# Patient Record
Sex: FEMALE | Race: WHITE | NOT HISPANIC OR LATINO | Employment: OTHER | URBAN - METROPOLITAN AREA
[De-identification: names, ages, dates, MRNs, and addresses within clinical notes are randomized per-mention and may not be internally consistent; named-entity substitution may affect disease eponyms.]

---

## 2017-02-08 ENCOUNTER — PATIENT MESSAGE (OUTPATIENT)
Dept: INTERNAL MEDICINE | Facility: IMAGING CENTER | Age: 67
End: 2017-02-08

## 2017-02-08 DIAGNOSIS — L65.9 ALOPECIA: ICD-10-CM

## 2017-02-08 NOTE — TELEPHONE ENCOUNTER
From: Michelle Rosenberg  To: Erica Bernard M.D.  Sent: 2/8/2017 1:14 PM PST  Subject: Non-Urgent Medical Question    Good afternoon Dr. Bernard....    I was having my hair colored today and my  brought up his concern for the third month in a row. I have a bald spot started on the back/side of my head. He thinks it's a kind of alopecia and suggests I see a dermatologist. It's about the size of a nickel. Three months ago it's was the size of a pea.     Can you suggest one to visit?    Sincerely,    Michelle rosenberg

## 2017-03-07 ENCOUNTER — SLEEP CENTER VISIT (OUTPATIENT)
Dept: SLEEP MEDICINE | Facility: MEDICAL CENTER | Age: 67
End: 2017-03-07
Payer: COMMERCIAL

## 2017-03-07 VITALS
RESPIRATION RATE: 16 BRPM | TEMPERATURE: 97.5 F | BODY MASS INDEX: 30.56 KG/M2 | WEIGHT: 179 LBS | OXYGEN SATURATION: 93 % | SYSTOLIC BLOOD PRESSURE: 120 MMHG | DIASTOLIC BLOOD PRESSURE: 80 MMHG | HEART RATE: 79 BPM | HEIGHT: 64 IN

## 2017-03-07 DIAGNOSIS — G47.34 NOCTURNAL HYPOXEMIA: ICD-10-CM

## 2017-03-07 DIAGNOSIS — I48.0 PAROXYSMAL ATRIAL FIBRILLATION (HCC): Chronic | ICD-10-CM

## 2017-03-07 DIAGNOSIS — Z87.891 FORMER SMOKER: ICD-10-CM

## 2017-03-07 PROCEDURE — 99202 OFFICE O/P NEW SF 15 MIN: CPT | Performed by: NURSE PRACTITIONER

## 2017-03-07 NOTE — PROGRESS NOTES
Chief Complaint   Patient presents with   • New Patient         HPI: This patient is a 66 y.o. female, who presents for evaluation and management of suspected sleep-disordered breathing. She was referred by cardiology Dr. Walter Brady. She has a history of paroxysmal atrial fibrillation, status post cardioversion x2. She is anticoagulated with Pradaxa. History of breast cancer with bilateral mastectomy and chemotherapy in 1996. She completed tamoxifen for 5 years. Patient denies significant sleep symptoms. She typically falls asleep quickly, only wakes up one time per night to urinate. Denies daytime hypersomnolence or morning headaches. She awakens spontaneously and feels rested. She is unsure if she snores. She lives alone. Denies symptoms of narcolepsy, cataplexy or RLS. Overall feels very well. She denies history of ENT surgery. She is a former smoker, 88-pack-year history, quit in 2012. Denies respiratory symptoms.    Past Medical History   Diagnosis Date   • Atrial fibrillation (CMS-HCC)    • History of breast cancer 7/29/2016     Bilateral--status post bilat mastectomy, chemo. 1996 Tamoxifen x 5 yr.    • Cold 11/28/2016     resolved    • Paroxysmal atrial fibrillation (CMS-HCC)    • Cancer (CMS-HCC) 1996     Bilat breast , chemo and reconstruction   • Cataract      bilateral   • Breast cancer (CMS-HCC)    • Kidney stone    • Pneumonia    • Nasal drainage    • Chickenpox    • Mumps    • Influenza    • Tonsillitis    • Smallpox        Social History   Substance Use Topics   • Smoking status: Former Smoker -- 2.00 packs/day for 44 years     Quit date: 02/14/2012   • Smokeless tobacco: Never Used   • Alcohol Use: Yes      Comment: social        Family History   Problem Relation Age of Onset   • Heart Disease Mother    • Cancer Paternal Grandmother      breast   • Heart Failure Sister      atrial fibrillation and CHF       Current medications as of today   Current Outpatient Prescriptions   Medication Sig  "Dispense Refill   • flecainide (TAMBOCOR) 100 MG Tab Take 1 Tab by mouth 2 times a day. 180 Tab 3   • Calcium Citrate-Vitamin D (CALCIUM + D PO) Take 1,200 mg by mouth.     • dabigatran (PRADAXA) 150 MG Cap capsule Take 1 Cap by mouth 2 Times a Day. 180 Cap 3   • vitamin D (CHOLECALCIFEROL) 1000 UNIT Tab Take 2,000 Units by mouth every day.     • MAGNESIUM PO      • OMEGA-3 KRILL OIL PO Take 1 Tab by mouth every day.       • Probiotic Product (PROBIOTIC PO) Take 1 Tab by mouth every day.         No current facility-administered medications for this visit.       Allergies: Blackberry flavor; Blueberry flavor; Cranberry; Food; Raspberry; Strawberry; Iodine; and Vicodin    Blood pressure 120/80, pulse 79, temperature 36.4 °C (97.5 °F), resp. rate 16, height 1.626 m (5' 4.02\"), weight 81.194 kg (179 lb), SpO2 93 %.      ROS:   Constitutional: Denies fevers, chills, night sweats, weight loss or fatigue  HEENT: Denies earache, difficulty hearing, tinnitus, nasal congestion, hoarseness  Cardiovascular: Denies chest pain, tightness, palpitations, orthopnea or edema  Respiratory: Denies cough, wheeze, dyspnea, hemoptysis  Sleep: See HPI  GI: Denies heartburn, dysphagia, nausea, abdominal pain, diarrhea or constipation  : Denies frequent urination, hematuria, discharge or painful urination  Musculoskeletal: Denies back pain, painful joints, sore muscles  Neurological: Denies weakness or headaches  Skin: No rashes    Physical exam:   Appearance: Well-nourished, well-developed, in no acute distress  HEENT: Normocephalic, atraumatic, white sclera, PERRLA  Respiratory: no intercostal retractions or accessory muscle use   Lungs auscultation: Clear to auscultation bilaterally  Cardiovascular: Regular rate rhythm no murmurs, rubs or gallops  Gait: Normal  Digits: No clubbing, cyanosis  Motor: No focal deficits  Orientation: Oriented to time, person and place    Diagnosis:  1. Paroxysmal atrial fibrillation (HCC)  OVERNIGHT HOME " SLEEP STUDY   2. Nocturnal hypoxemia     3. Former smoker         Plan:  1. Home sleep study now  2. Follow-up after study to review results  3. Consider lung cancer screening CT given smoking history at next visit

## 2017-03-07 NOTE — MR AVS SNAPSHOT
"Michelle Ponce May   3/7/2017 9:40 AM   Sleep Center Visit   MRN: [id number]    Department:  [facility]   Dept Phone:  [phone]    Description:  Female : 1950   Provider:  MATTHIAS Thomas           Reason for Visit     New Patient           Allergies as of 3/7/2017     Allergen Noted Reactions    Blackberry Flavor 02/15/2013   Anaphylaxis    anaphylaxis    Blueberry Flavor 02/15/2013   Anaphylaxis    anaphylaxis    Cranberry 02/15/2013   Anaphylaxis    anaphylaxis    Food 10/28/2012   Anaphylaxis    All Berries      Raspberry 02/15/2013   Anaphylaxis    anaphylaxis    Strawberry 02/15/2013   Anaphylaxis    anaphylaxis    Iodine 10/28/2012   Swelling    Swelling- IV  Contrast, topical OK    Vicodin [Hydrocodone-Acetaminophen] 02/15/2013   Vomiting    sick      You were diagnosed with     Paroxysmal atrial fibrillation (CMS-HCC)   [450349]       Nocturnal hypoxemia   [373901]       Former smoker   [851448]         Vital Signs     Blood Pressure Pulse Temperature Respirations Height Weight    120/80 mmHg 79 36.4 °C (97.5 °F) 16 1.626 m (5' 4.02\") 81.194 kg (179 lb)    Body Mass Index Oxygen Saturation Smoking Status             30.71 kg/m2 93% Former Smoker         Basic Information     Date Of Birth Sex Race Ethnicity Preferred Language    1950 Female White Non- English      Your appointments     Mar 21, 2017  2:10 PM   Sleep Study Home with SLEEP CLINIC   [facility] (--)    [address]  [facility] [address]  [email or web address] [id number]   [phone]            2017  9:00 AM   Follow UP with MATTHIAS Thomas   [facility] (--)    [address]  [facility] [address]  [email or web address] [address]   [phone]              Problem List              ICD-10-CM Priority Class Noted - Resolved    Nephrolithiasis N20.0   10/29/2012 - Present    Paroxysmal atrial fibrillation (HCC) (Chronic) I48.0   Unknown - Present   " Dyslipidemia E78.5   10/31/2012 - Present    History of breast cancer Z85.3   7/29/2016 - Present    Nocturnal hypoxemia G47.34   3/7/2017 - Present    Former smoker Z87.891   3/7/2017 - Present      Health Maintenance        Date Due Completion Dates    IMM ZOSTER VACCINE 11/21/2009 ---    IMM PNEUMOCOCCAL 65+ (ADULT) LOW/MEDIUM RISK SERIES (2 of 2 - PPSV23) 2/15/2018 9/12/2016, 2/15/2013    COLONOSCOPY 1/31/2019 1/31/2009 (Done)    Override on 1/31/2009: Done (Dr. Luo did the colonoscopy but does not have the file anymore)    PAP SMEAR 8/9/2019 8/9/2016    BONE DENSITY 8/31/2021 8/31/2016, 12/10/2008    IMM DTaP/Tdap/Td Vaccine (3 - Td) 8/9/2026 8/9/2016, 9/12/2002            Current Immunizations     13-VALENT PCV PREVNAR 9/12/2016    Influenza TIV (IM) 10/28/2012  8:00 PM    Influenza Vaccine Adult HD 11/11/2016    Pneumococcal polysaccharide vaccine (PPSV-23) 2/15/2013  9:39 PM    Tdap Vaccine 8/9/2016, 9/12/2002      Below and/or attached are the medications your provider expects you to take. Review all of your home medications and newly ordered medications with your provider and/or pharmacist. Follow medication instructions as directed by your provider and/or pharmacist. Please keep your medication list with you and share with your provider. Update the information when medications are discontinued, doses are changed, or new medications (including over-the-counter products) are added; and carry medication information at all times in the event of emergency situations     Allergies:  BLACKBERRY FLAVOR - Anaphylaxis     BLUEBERRY FLAVOR - Anaphylaxis     CRANBERRY - Anaphylaxis     FOOD - Anaphylaxis     RASPBERRY - Anaphylaxis     STRAWBERRY - Anaphylaxis     IODINE - Swelling     VICODIN - Vomiting               Medications  Valid as of: March 07, 2017 - 10:06 AM    Generic Name Brand Name Tablet Size Instructions for use    Calcium Citrate-Vitamin D   Take 1,200 mg by mouth.        Cholecalciferol (Tab)  cholecalciferol 1000 UNIT Take 2,000 Units by mouth every day.        Dabigatran Etexilate Mesylate (Cap) PRADAXA 150 MG Take 1 Cap by mouth 2 Times a Day.        Flecainide Acetate (Tab) TAMBOCOR 100 MG Take 1 Tab by mouth 2 times a day.        Krill Oil   Take 1 Tab by mouth every day.          Magnesium           Probiotic Product   Take 1 Tab by mouth every day.          .                 Medicines prescribed today were sent to:     [id number] - [place], [place] - [address]    [address] [place] [address]    Phone: [phone] Fax: [phone]    Open 24 Hours?: No      Medication refill instructions:       If your prescription bottle indicates you have medication refills left, it is not necessary to call your provider’s office. Please contact your pharmacy and they will refill your medication.    If your prescription bottle indicates you do not have any refills left, you may request refills at any time through one of the following ways: The online [employer] system (except Urgent Care), by calling your provider’s office, or by asking your pharmacy to contact your provider’s office with a refill request. Medication refills are processed only during regular business hours and may not be available until the next business day. Your provider may request additional information or to have a follow-up visit with you prior to refilling your medication.   *Please Note: Medication refills are assigned a new Rx number when refilled electronically. Your pharmacy may indicate that no refills were authorized even though a new prescription for the same medication is available at the pharmacy. Please request the medicine by name with the pharmacy before contacting your provider for a refill.        Your To Do List     Future Labs/Procedures Complete By Expires    OVERNIGHT HOME SLEEP STUDY  As directed 3/7/2018      Instructions    1. Home sleep study ASAP  2. Follow up after testing to review          [employer] Access  Code: Activation code not generated  Current MyChart Status: Active

## 2017-03-20 ENCOUNTER — RX ONLY (OUTPATIENT)
Age: 67
Setting detail: RX ONLY
End: 2017-03-20

## 2017-03-20 ENCOUNTER — HOSPITAL ENCOUNTER (OUTPATIENT)
Dept: LAB | Facility: MEDICAL CENTER | Age: 67
End: 2017-03-20
Attending: NURSE PRACTITIONER
Payer: COMMERCIAL

## 2017-03-20 PROBLEM — L63.9 ALOPECIA AREATA, UNSPECIFIED: Status: ACTIVE | Noted: 2017-03-20

## 2017-03-20 LAB — FERRITIN SERPL-MCNC: 91.7 NG/ML (ref 10–291)

## 2017-03-20 PROCEDURE — 36415 COLL VENOUS BLD VENIPUNCTURE: CPT

## 2017-03-20 PROCEDURE — 82728 ASSAY OF FERRITIN: CPT

## 2017-03-20 PROCEDURE — 86038 ANTINUCLEAR ANTIBODIES: CPT

## 2017-03-21 ENCOUNTER — HOME STUDY (OUTPATIENT)
Dept: SLEEP MEDICINE | Facility: MEDICAL CENTER | Age: 67
End: 2017-03-21
Attending: NURSE PRACTITIONER
Payer: COMMERCIAL

## 2017-03-21 DIAGNOSIS — I48.0 PAROXYSMAL ATRIAL FIBRILLATION (HCC): Chronic | ICD-10-CM

## 2017-03-21 PROCEDURE — 95806 SLEEP STUDY UNATT&RESP EFFT: CPT | Performed by: INTERNAL MEDICINE

## 2017-03-21 NOTE — PROCEDURES
Ms. Diego has a history of paroxysmal atrial fibrillation but she does not have other sleep or medical diagnoses that would contraindicate a home sleep test.    7 hours of data are available for review and the information appears to be of good quality for analysis.    The apnea hypopnea index is 23.2 events per hour, primarily including hypopnea episodes with occasional obstructive apneas.  The lowest arterial oxygen saturation is 70% on room air with an oxygen saturation desaturation index of 33.3 events per hour.  She spends 97% of the study time, or almost 7 hours in total, with a saturation less than 90%.  The heart rate varies from 60-84 bpm.  Frequent snoring is noted.    Assessment:  Moderate obstructive sleep apnea hypopnea with an apnea hypopnea index of 23.2 events per hour.  Significant nocturnal hypoxemia with a lowest arterial oxygen saturation of 70% on room air.    Recommendations:  Airway pressurization, ideally guided by a titration polysomnogram.  Auto titrating CPAP is an option. Alternative treatments for sleep-disordered breathing include reconstructive otolaryngologic surgery, dental appliances and weight loss. If any these latter treatment options are selected, a follow-up polysomnogram is suggested to assess the efficacy of therapy. Behavioral measures including avoidance of sedatives, alcohol and supine body position may be of additional benefit.

## 2017-03-21 NOTE — MR AVS SNAPSHOT
Michelle Ponce May   3/21/2017 2:10 PM   Home Study   MRN: [id number]    Department:  [facility]   Dept Phone:  [phone]    Description:  Female : 1950   Provider:  Senthil VALENZUELA M.D.           Allergies as of 3/21/2017     Allergen Noted Reactions    Blackberry Flavor 02/15/2013   Anaphylaxis    anaphylaxis    Blueberry Flavor 02/15/2013   Anaphylaxis    anaphylaxis    Cranberry 02/15/2013   Anaphylaxis    anaphylaxis    Food 10/28/2012   Anaphylaxis    All Berries      Raspberry 02/15/2013   Anaphylaxis    anaphylaxis    Strawberry 02/15/2013   Anaphylaxis    anaphylaxis    Iodine 10/28/2012   Swelling    Swelling- IV  Contrast, topical OK    Vicodin [Hydrocodone-Acetaminophen] 02/15/2013   Vomiting    sick      You were diagnosed with     Paroxysmal atrial fibrillation (CMS-HCC)   [332148]         Vital Signs     Smoking Status                   Former Smoker           Basic Information     Date Of Birth Sex Race Ethnicity Preferred Language    1950 Female White Non- English      Your appointments     2017  9:00 AM   Follow UP with MATTHIAS Thomas   [facility] (--)    [address]  [place] [address]   [phone]              Problem List              ICD-10-CM Priority Class Noted - Resolved    Nephrolithiasis N20.0   10/29/2012 - Present    Paroxysmal atrial fibrillation (HCC) (Chronic) I48.0   Unknown - Present    Dyslipidemia E78.5   10/31/2012 - Present    History of breast cancer Z85.3   2016 - Present    Nocturnal hypoxemia G47.34   3/7/2017 - Present    Former smoker Z87.891   3/7/2017 - Present      Health Maintenance        Date Due Completion Dates    IMM ZOSTER VACCINE 2009 ---    IMM PNEUMOCOCCAL 65+ (ADULT) LOW/MEDIUM RISK SERIES (2 of 2 - PPSV23) 2/15/2018 2016, 2/15/2013    COLONOSCOPY 2019 (Done)    Override on 2009: Done (Dr. Luo did the  colonoscopy but does not have the file anymore)    PAP SMEAR 8/9/2019 8/9/2016    BONE DENSITY 8/31/2021 8/31/2016, 12/10/2008    IMM DTaP/Tdap/Td Vaccine (3 - Td) 8/9/2026 8/9/2016, 9/12/2002            Current Immunizations     13-VALENT PCV PREVNAR 9/12/2016    Influenza TIV (IM) 10/28/2012  8:00 PM    Influenza Vaccine Adult HD 11/11/2016    Pneumococcal polysaccharide vaccine (PPSV-23) 2/15/2013  9:39 PM    Tdap Vaccine 8/9/2016, 9/12/2002      Below and/or attached are the medications your provider expects you to take. Review all of your home medications and newly ordered medications with your provider and/or pharmacist. Follow medication instructions as directed by your provider and/or pharmacist. Please keep your medication list with you and share with your provider. Update the information when medications are discontinued, doses are changed, or new medications (including over-the-counter products) are added; and carry medication information at all times in the event of emergency situations     Allergies:  BLACKBERRY FLAVOR - Anaphylaxis     BLUEBERRY FLAVOR - Anaphylaxis     CRANBERRY - Anaphylaxis     FOOD - Anaphylaxis     RASPBERRY - Anaphylaxis     STRAWBERRY - Anaphylaxis     IODINE - Swelling     VICODIN - Vomiting               Medications  Valid as of: March 21, 2017 -  2:21 PM    Generic Name Brand Name Tablet Size Instructions for use    Calcium Citrate-Vitamin D   Take 1,200 mg by mouth.        Cholecalciferol (Tab) cholecalciferol 1000 UNIT Take 2,000 Units by mouth every day.        Dabigatran Etexilate Mesylate (Cap) PRADAXA 150 MG Take 1 Cap by mouth 2 Times a Day.        Flecainide Acetate (Tab) TAMBOCOR 100 MG Take 1 Tab by mouth 2 times a day.        Krill Oil   Take 1 Tab by mouth every day.          Magnesium           Probiotic Product   Take 1 Tab by mouth every day.          .                 Medicines prescribed today were sent to:     [id number] [place], [place] - [address]     [address] [place] [address]    Phone: [phone] Fax: [phone]    Open 24 Hours?: No      Medication refill instructions:       If your prescription bottle indicates you have medication refills left, it is not necessary to call your provider’s office. Please contact your pharmacy and they will refill your medication.    If your prescription bottle indicates you do not have any refills left, you may request refills at any time through one of the following ways: The online [employer] system (except Urgent Care), by calling your provider’s office, or by asking your pharmacy to contact your provider’s office with a refill request. Medication refills are processed only during regular business hours and may not be available until the next business day. Your provider may request additional information or to have a follow-up visit with you prior to refilling your medication.   *Please Note: Medication refills are assigned a new Rx number when refilled electronically. Your pharmacy may indicate that no refills were authorized even though a new prescription for the same medication is available at the pharmacy. Please request the medicine by name with the pharmacy before contacting your provider for a refill.           [employer] Access Code: Activation code not generated  Current [employer] Prote[employer] Status: Active

## 2017-03-22 LAB — NUCLEAR IGG SER QL IA: NORMAL

## 2017-04-28 ENCOUNTER — APPOINTMENT (OUTPATIENT)
Dept: SLEEP MEDICINE | Facility: MEDICAL CENTER | Age: 67
End: 2017-04-28
Payer: COMMERCIAL

## 2017-10-14 ENCOUNTER — NON-PROVIDER VISIT (OUTPATIENT)
Dept: INTERNAL MEDICINE | Facility: IMAGING CENTER | Age: 67
End: 2017-10-14
Payer: COMMERCIAL

## 2017-10-14 DIAGNOSIS — Z23 NEED FOR INFLUENZA VACCINATION: ICD-10-CM

## 2017-10-14 PROCEDURE — 90662 IIV NO PRSV INCREASED AG IM: CPT | Performed by: FAMILY MEDICINE

## 2017-10-14 PROCEDURE — 90471 IMMUNIZATION ADMIN: CPT | Performed by: FAMILY MEDICINE

## 2017-12-27 DIAGNOSIS — I48.0 PAROXYSMAL ATRIAL FIBRILLATION (HCC): ICD-10-CM

## 2017-12-27 RX ORDER — DABIGATRAN ETEXILATE 150 MG/1
150 CAPSULE ORAL 2 TIMES DAILY
Qty: 180 CAP | Refills: 0 | OUTPATIENT
Start: 2017-12-27 | End: 2021-09-28 | Stop reason: SDUPTHER

## 2018-10-11 ENCOUNTER — NON-PROVIDER VISIT (OUTPATIENT)
Dept: INTERNAL MEDICINE | Facility: IMAGING CENTER | Age: 68
End: 2018-10-11
Payer: COMMERCIAL

## 2018-10-11 DIAGNOSIS — Z23 NEED FOR INFLUENZA VACCINATION: ICD-10-CM

## 2018-10-11 DIAGNOSIS — Z23 NEED FOR PNEUMOCOCCAL VACCINATION: ICD-10-CM

## 2018-10-11 PROCEDURE — 90662 IIV NO PRSV INCREASED AG IM: CPT | Performed by: FAMILY MEDICINE

## 2018-10-11 PROCEDURE — 90471 IMMUNIZATION ADMIN: CPT | Performed by: FAMILY MEDICINE

## 2018-10-11 PROCEDURE — 90732 PPSV23 VACC 2 YRS+ SUBQ/IM: CPT | Performed by: FAMILY MEDICINE

## 2018-10-11 PROCEDURE — 90472 IMMUNIZATION ADMIN EACH ADD: CPT | Performed by: FAMILY MEDICINE

## 2018-10-19 ENCOUNTER — NON-PROVIDER VISIT (OUTPATIENT)
Dept: INTERNAL MEDICINE | Facility: IMAGING CENTER | Age: 68
End: 2018-10-19
Payer: COMMERCIAL

## 2018-10-19 DIAGNOSIS — Z23 NEED FOR SHINGLES VACCINE: ICD-10-CM

## 2018-10-19 PROCEDURE — 90750 HZV VACC RECOMBINANT IM: CPT | Performed by: FAMILY MEDICINE

## 2018-10-19 PROCEDURE — 90471 IMMUNIZATION ADMIN: CPT | Performed by: FAMILY MEDICINE

## 2019-02-08 ENCOUNTER — NON-PROVIDER VISIT (OUTPATIENT)
Dept: INTERNAL MEDICINE | Facility: IMAGING CENTER | Age: 69
End: 2019-02-08
Payer: COMMERCIAL

## 2019-02-08 DIAGNOSIS — Z23 NEED FOR SHINGLES VACCINE: ICD-10-CM

## 2019-02-08 PROCEDURE — 90750 HZV VACC RECOMBINANT IM: CPT | Performed by: FAMILY MEDICINE

## 2019-02-08 PROCEDURE — 90471 IMMUNIZATION ADMIN: CPT | Performed by: FAMILY MEDICINE

## 2019-10-11 ENCOUNTER — NON-PROVIDER VISIT (OUTPATIENT)
Dept: INTERNAL MEDICINE | Facility: IMAGING CENTER | Age: 69
End: 2019-10-11
Payer: COMMERCIAL

## 2019-10-11 DIAGNOSIS — Z23 NEED FOR INFLUENZA VACCINATION: ICD-10-CM

## 2019-10-11 PROCEDURE — 90471 IMMUNIZATION ADMIN: CPT | Performed by: FAMILY MEDICINE

## 2019-10-11 PROCEDURE — 90662 IIV NO PRSV INCREASED AG IM: CPT | Performed by: FAMILY MEDICINE

## 2020-10-21 ENCOUNTER — NON-PROVIDER VISIT (OUTPATIENT)
Dept: INTERNAL MEDICINE | Facility: IMAGING CENTER | Age: 70
End: 2020-10-21
Payer: COMMERCIAL

## 2020-10-21 DIAGNOSIS — Z23 NEED FOR INFLUENZA VACCINATION: ICD-10-CM

## 2020-10-21 PROCEDURE — 90471 IMMUNIZATION ADMIN: CPT | Performed by: FAMILY MEDICINE

## 2020-10-21 PROCEDURE — 90662 IIV NO PRSV INCREASED AG IM: CPT | Performed by: FAMILY MEDICINE

## 2020-11-05 DIAGNOSIS — E55.9 VITAMIN D DEFICIENCY: ICD-10-CM

## 2020-11-05 DIAGNOSIS — Z11.59 ENCOUNTER FOR HEPATITIS C SCREENING TEST FOR LOW RISK PATIENT: ICD-10-CM

## 2020-11-05 DIAGNOSIS — E78.5 DYSLIPIDEMIA: ICD-10-CM

## 2020-11-05 DIAGNOSIS — R73.9 HYPERGLYCEMIA: ICD-10-CM

## 2020-11-05 DIAGNOSIS — I48.0 PAROXYSMAL ATRIAL FIBRILLATION (HCC): ICD-10-CM

## 2020-11-06 ENCOUNTER — NON-PROVIDER VISIT (OUTPATIENT)
Dept: INTERNAL MEDICINE | Facility: IMAGING CENTER | Age: 70
End: 2020-11-06
Payer: COMMERCIAL

## 2020-11-06 ENCOUNTER — HOSPITAL ENCOUNTER (OUTPATIENT)
Facility: MEDICAL CENTER | Age: 70
End: 2020-11-06
Attending: FAMILY MEDICINE
Payer: COMMERCIAL

## 2020-11-06 PROCEDURE — 82306 VITAMIN D 25 HYDROXY: CPT

## 2020-11-06 PROCEDURE — 80053 COMPREHEN METABOLIC PANEL: CPT

## 2020-11-06 PROCEDURE — 85025 COMPLETE CBC W/AUTO DIFF WBC: CPT

## 2020-11-06 PROCEDURE — 86803 HEPATITIS C AB TEST: CPT

## 2020-11-06 PROCEDURE — 83036 HEMOGLOBIN GLYCOSYLATED A1C: CPT

## 2020-11-06 PROCEDURE — 80061 LIPID PANEL: CPT

## 2020-11-07 DIAGNOSIS — Z11.59 ENCOUNTER FOR HEPATITIS C SCREENING TEST FOR LOW RISK PATIENT: ICD-10-CM

## 2020-11-07 DIAGNOSIS — I48.0 PAROXYSMAL ATRIAL FIBRILLATION (HCC): ICD-10-CM

## 2020-11-07 DIAGNOSIS — E78.5 DYSLIPIDEMIA: ICD-10-CM

## 2020-11-07 DIAGNOSIS — R73.9 HYPERGLYCEMIA: ICD-10-CM

## 2020-11-07 DIAGNOSIS — E55.9 VITAMIN D DEFICIENCY: ICD-10-CM

## 2020-11-07 LAB
25(OH)D3 SERPL-MCNC: 24 NG/ML (ref 30–100)
ALBUMIN SERPL BCP-MCNC: 4.3 G/DL (ref 3.2–4.9)
ALBUMIN/GLOB SERPL: 1.4 G/DL
ALP SERPL-CCNC: 77 U/L (ref 30–99)
ALT SERPL-CCNC: 17 U/L (ref 2–50)
ANION GAP SERPL CALC-SCNC: 11 MMOL/L (ref 7–16)
AST SERPL-CCNC: 19 U/L (ref 12–45)
BASOPHILS # BLD AUTO: 1.1 % (ref 0–1.8)
BASOPHILS # BLD: 0.06 K/UL (ref 0–0.12)
BILIRUB SERPL-MCNC: 1 MG/DL (ref 0.1–1.5)
BUN SERPL-MCNC: 17 MG/DL (ref 8–22)
CALCIUM SERPL-MCNC: 9.3 MG/DL (ref 8.5–10.5)
CHLORIDE SERPL-SCNC: 92 MMOL/L (ref 96–112)
CHOLEST SERPL-MCNC: 199 MG/DL (ref 100–199)
CO2 SERPL-SCNC: 26 MMOL/L (ref 20–33)
CREAT SERPL-MCNC: 0.92 MG/DL (ref 0.5–1.4)
EOSINOPHIL # BLD AUTO: 0.35 K/UL (ref 0–0.51)
EOSINOPHIL NFR BLD: 6.4 % (ref 0–6.9)
ERYTHROCYTE [DISTWIDTH] IN BLOOD BY AUTOMATED COUNT: 45.1 FL (ref 35.9–50)
EST. AVERAGE GLUCOSE BLD GHB EST-MCNC: 105 MG/DL
GLOBULIN SER CALC-MCNC: 3 G/DL (ref 1.9–3.5)
GLUCOSE SERPL-MCNC: 81 MG/DL (ref 65–99)
HBA1C MFR BLD: 5.3 % (ref 0–5.6)
HCT VFR BLD AUTO: 50.8 % (ref 37–47)
HCV AB SER QL: NORMAL
HDLC SERPL-MCNC: 55 MG/DL
HGB BLD-MCNC: 16.4 G/DL (ref 12–16)
IMM GRANULOCYTES # BLD AUTO: 0.01 K/UL (ref 0–0.11)
IMM GRANULOCYTES NFR BLD AUTO: 0.2 % (ref 0–0.9)
LDLC SERPL CALC-MCNC: 120 MG/DL
LYMPHOCYTES # BLD AUTO: 1.22 K/UL (ref 1–4.8)
LYMPHOCYTES NFR BLD: 22.3 % (ref 22–41)
MCH RBC QN AUTO: 30.6 PG (ref 27–33)
MCHC RBC AUTO-ENTMCNC: 32.3 G/DL (ref 33.6–35)
MCV RBC AUTO: 94.8 FL (ref 81.4–97.8)
MONOCYTES # BLD AUTO: 0.65 K/UL (ref 0–0.85)
MONOCYTES NFR BLD AUTO: 11.9 % (ref 0–13.4)
NEUTROPHILS # BLD AUTO: 3.17 K/UL (ref 2–7.15)
NEUTROPHILS NFR BLD: 58.1 % (ref 44–72)
NRBC # BLD AUTO: 0.02 K/UL
NRBC BLD-RTO: 0.4 /100 WBC
PLATELET # BLD AUTO: 249 K/UL (ref 164–446)
PMV BLD AUTO: 10.6 FL (ref 9–12.9)
POTASSIUM SERPL-SCNC: 4.2 MMOL/L (ref 3.6–5.5)
PROT SERPL-MCNC: 7.3 G/DL (ref 6–8.2)
RBC # BLD AUTO: 5.36 M/UL (ref 4.2–5.4)
SODIUM SERPL-SCNC: 129 MMOL/L (ref 135–145)
TRIGL SERPL-MCNC: 119 MG/DL (ref 0–149)
WBC # BLD AUTO: 5.5 K/UL (ref 4.8–10.8)

## 2020-11-12 ENCOUNTER — HOSPITAL ENCOUNTER (OUTPATIENT)
Facility: MEDICAL CENTER | Age: 70
End: 2020-11-12
Attending: FAMILY MEDICINE
Payer: COMMERCIAL

## 2020-11-12 ENCOUNTER — OFFICE VISIT (OUTPATIENT)
Dept: INTERNAL MEDICINE | Facility: IMAGING CENTER | Age: 70
End: 2020-11-12
Payer: COMMERCIAL

## 2020-11-12 VITALS
TEMPERATURE: 98 F | HEIGHT: 64 IN | SYSTOLIC BLOOD PRESSURE: 140 MMHG | OXYGEN SATURATION: 94 % | DIASTOLIC BLOOD PRESSURE: 78 MMHG | BODY MASS INDEX: 29.37 KG/M2 | HEART RATE: 98 BPM | WEIGHT: 172 LBS | RESPIRATION RATE: 16 BRPM

## 2020-11-12 DIAGNOSIS — N20.0 CALCULUS OF KIDNEY: ICD-10-CM

## 2020-11-12 DIAGNOSIS — M79.604 RIGHT LEG PAIN: ICD-10-CM

## 2020-11-12 DIAGNOSIS — I48.0 PAROXYSMAL ATRIAL FIBRILLATION (HCC): Chronic | ICD-10-CM

## 2020-11-12 DIAGNOSIS — R32 INCONTINENCE IN FEMALE: ICD-10-CM

## 2020-11-12 DIAGNOSIS — Z87.891 FORMER SMOKER: ICD-10-CM

## 2020-11-12 DIAGNOSIS — Z12.11 COLON CANCER SCREENING: ICD-10-CM

## 2020-11-12 DIAGNOSIS — R82.90 ABNORMAL URINALYSIS: ICD-10-CM

## 2020-11-12 DIAGNOSIS — N30.00 ACUTE CYSTITIS WITHOUT HEMATURIA: ICD-10-CM

## 2020-11-12 DIAGNOSIS — G47.34 NOCTURNAL HYPOXEMIA: ICD-10-CM

## 2020-11-12 PROCEDURE — 99214 OFFICE O/P EST MOD 30 MIN: CPT | Performed by: FAMILY MEDICINE

## 2020-11-12 PROCEDURE — 87086 URINE CULTURE/COLONY COUNT: CPT

## 2020-11-12 PROCEDURE — 87077 CULTURE AEROBIC IDENTIFY: CPT

## 2020-11-12 PROCEDURE — 87186 SC STD MICRODIL/AGAR DIL: CPT

## 2020-11-12 PROCEDURE — 84550 ASSAY OF BLOOD/URIC ACID: CPT

## 2020-11-12 PROCEDURE — 81001 URINALYSIS AUTO W/SCOPE: CPT

## 2020-11-12 ASSESSMENT — FIBROSIS 4 INDEX: FIB4 SCORE: 1.3

## 2020-11-12 ASSESSMENT — PATIENT HEALTH QUESTIONNAIRE - PHQ9: CLINICAL INTERPRETATION OF PHQ2 SCORE: 0

## 2020-11-12 NOTE — ASSESSMENT & PLAN NOTE
Last 2-3 years ago and did not like Dr. Brady. Has been taking her sister's med since she was taking this med.

## 2020-11-12 NOTE — PROGRESS NOTES
Subjective:     CC: urine incontinence and establish care    HPI: Michelle presents today with a complaint for the last 10 months having incontinence of her urination.  Patient is having to wear a pad she has noticed that if she has to urinate she has to go.  Patient denies any problems with leakage of urine on coughing or laughing a lot.  Patient has tried Kegel's and has noticed that this has not helped her.  Patient also notes that she needs to be a little bit more persistent about follow-up for her medical care and states that having to see me as her new provider has given her the ability to follow-up.  Patient does have some discomfort to her left upper side late      Past Medical History:   Diagnosis Date   • Atrial fibrillation (HCC)    • Breast cancer (HCC)    • Cancer (HCC)     Bilat breast , chemo and reconstruction   • Cataract     bilateral   • Chickenpox    • Cold 2016    resolved    • History of breast cancer 2016    Bilateral--status post bilat mastectomy, chemo.  Tamoxifen x 5 yr.    • Influenza    • Kidney stone    • Mumps    • Nasal drainage    • Paroxysmal atrial fibrillation (HCC)    • Pneumonia    • Smallpox    • Tonsillitis        Social History     Tobacco Use   • Smoking status: Former Smoker     Packs/day: 2.00     Years: 44.00     Pack years: 88.00     Quit date: 2012     Years since quittin.7   • Smokeless tobacco: Never Used   Substance Use Topics   • Alcohol use: Yes     Comment: social    • Drug use: No       Current Outpatient Medications Ordered in Epic   Medication Sig Dispense Refill   • Safety Seal Miscellaneous Misc Inflamazol 1 capsule once daily     • dabigatran (PRADAXA) 150 MG Cap capsule Take 1 Cap by mouth 2 Times a Day. Needs to be seen for further refills. Thank you 180 Cap 0   • MAGNESIUM PO      • OMEGA-3 KRILL OIL PO Take 1 Tab by mouth every day.       • Calcium Citrate-Vitamin D (CALCIUM + D PO) Take 1,200 mg by mouth.     • vitamin D  "(CHOLECALCIFEROL) 1000 UNIT Tab Take 2,000 Units by mouth every day.     • Probiotic Product (PROBIOTIC PO) Take 1 Tab by mouth every day.         No current Epic-ordered facility-administered medications on file.        Allergies:  Blackberry flavor, Blueberry flavor, Food, Raspberry, Strawberry, Iodine, and Vicodin [hydrocodone-acetaminophen]    Health Maintenance: Completed    ROS:  Gen: no fevers/chills, no changes in weight  Eyes: no changes in vision  Pulm: no sob, no cough  CV: no chest pain, no palpitations  : no dysuria  Neuro: no headaches  Heme/Lymph: no swelling    Objective:       Exam:  /78   Pulse 98   Temp 36.7 °C (98 °F) (Temporal)   Resp 16   Ht 1.626 m (5' 4\")   Wt 78 kg (172 lb)   SpO2 94%   BMI 29.52 kg/m²  Body mass index is 29.52 kg/m².    Gen: Alert and oriented, No apparent distress.  Neck: Neck is supple without lymphadenopathy.  Lungs: Normal effort, CTA bilaterally, no wheezes, rhonchi, or rales  CV: Irregular but normal rate no murmurs  Ext: No clubbing, cyanosis, edema.  Abdomen: soft no tenderness noted  Back: Straight up to 60 degrees without pain bilaterally and hips wnl range of movement without pain. No discomfort on palpation of the right lateral upper leg    Labs: Patient CBC is within normal limits her chemistry panel appears to be within normal limits blood sugar is slightly elevated along with her lipid panel her LDL was 120.  Patient's hepatitis C screen was negative    Assessment & Plan:     69 y.o. female with the following -       1. Paroxysmal atrial fibrillation (HCC)  Patient has not seen her cardiologist in over 3 years due to the fact she did not like him.  Patient is willing to see another cardiologist at this time.  Patient has been taking her Pradaxa but she is getting it from her sister who gets extra due to the fact her sister also has A. fib.  Patient states that this is the exact medication that she was taking prior to stopping to see her " cardiologist and she has continued to take it.  Patient is willing to see a cardiologist at this time so referral was sent.  This is a chronic problem.    2. Nephrolithiasis  Patient does have a history of once having a kidney stone which she says was from uric acid.  Patient has no problems since she thought she may have had a gouty attack over a year ago but was not seen by anyone for this.  I would definitely recommend getting the uric acid level on her at this time.  Patient was agreeable to this plan.  This is a chronic problem.  - URIC ACID, SERUM    3. Nocturnal hypoxemia  Patient did have a at home sleep study done about 3 to 4 years ago that was she was told it was positive.  Patient did not get the formal sleep study as recommended due to the fact that she did not want to leave her dog all by himself.  Patient denies that when she spends time with other friends and sleeps over that no one is complaining that she is snoring a lot. Her only bed partner is her dog at this time.  Patient at this time is not interested in getting a formal sleep study done I recommend that she let let whoever she stays with when she is visiting to let her know if they notice that she is snoring a lot.  This is a chronic problem.  4. Incontinence in female  It appears by her symptoms that she may have urge incontinence and not stress incontinence.  I recommend we get a urinalysis just to rule out the possibility of infection.  If her urinalysis is within normal limits patient is agreeable to try either Detrol or Ditropan for treatment of urge incontinence.  I did give her warnings that she may have dry mouth with this and just to drink more water.  This is a acute problem.  Reviewing your urinalysis done on day of visit which I did the next day shows possibility of urinary tract infection so we will treat her at this time await urine cultures.  Will contact patient and notify her of the results.  - URINALYSIS,CULTURE IF  INDICATED; Future    5. Former smoker  Patient does have more than a 30-year pack smoking history and did stop smoking 10 years ago.  I did discuss with her recommendations for looking into doing a low-dose CAT scan of the chest at this time patient does not want this.  Patient informs me that all the chest x-ray she has had done in the past have always been within normal limits with no abnormal findings.  I did mention to patient that a CT scan is more accurate detecting masses but at this time patient does not want to get one done.    6. Colon cancer screening  Patient does not want to get a colonoscopy at all she has tried getting a Cologuard but apparently on collection and sending since she has urine in the stool they will not processed her specimen.  Patient has tried different ways of trying to collect stool only but she was unable to do it.  I did recommend to patient that we just try doing a fit test instead patient was very agreeable with this and we will order it.  - OCCULT BLOOD,FECAL,IMMUNOASSAY    7. Right leg pain  On her leg exam it is within normal notes it does not feel.  That this is sciatica.  The area of discomfort which she only gets at nighttime is on the lateral side of her left upper leg I feel like that may be it it may be a local nerve issue.  I am wondering if she is put more pressure to that area and that is why she feels at nighttime patient is going to try sleeping with pillows in certain areas and also she seems to be sleeping on the very edge of her bed and may be changing where she is sleeping in her bed.  If she continues having problems we will need to reevaluate this.  This is a acute problem.    8. Abnormal urinalysis  Will treat and await urine culture results. Acute problem        Please note that this dictation was created using voice recognition software. I have made every reasonable attempt to correct obvious errors, but I expect that there are errors of grammar and  possibly content that I did not discover before finalizing the note.

## 2020-11-13 DIAGNOSIS — N30.00 ACUTE CYSTITIS WITHOUT HEMATURIA: ICD-10-CM

## 2020-11-13 PROBLEM — R82.90 ABNORMAL URINALYSIS: Status: ACTIVE | Noted: 2020-11-13

## 2020-11-13 LAB
APPEARANCE UR: ABNORMAL
BACTERIA #/AREA URNS HPF: ABNORMAL /HPF
BILIRUB UR QL STRIP.AUTO: NEGATIVE
COLOR UR: YELLOW
EPI CELLS #/AREA URNS HPF: ABNORMAL /HPF
GLUCOSE UR STRIP.AUTO-MCNC: NEGATIVE MG/DL
KETONES UR STRIP.AUTO-MCNC: NEGATIVE MG/DL
LEUKOCYTE ESTERASE UR QL STRIP.AUTO: ABNORMAL
MICRO URNS: ABNORMAL
NITRITE UR QL STRIP.AUTO: NEGATIVE
PH UR STRIP.AUTO: 6 [PH] (ref 5–8)
PROT UR QL STRIP: NEGATIVE MG/DL
RBC # URNS HPF: ABNORMAL /HPF
RBC UR QL AUTO: ABNORMAL
SP GR UR STRIP.AUTO: 1.01
TRANS CELLS #/AREA URNS HPF: ABNORMAL /HPF
URATE SERPL-MCNC: 6.6 MG/DL (ref 1.9–8.2)
UROBILINOGEN UR STRIP.AUTO-MCNC: 0.2 MG/DL
WBC #/AREA URNS HPF: ABNORMAL /HPF

## 2020-11-13 RX ORDER — NITROFURANTOIN 25; 75 MG/1; MG/1
100 CAPSULE ORAL 2 TIMES DAILY
Qty: 14 CAP | Refills: 0 | Status: SHIPPED
Start: 2020-11-13 | End: 2020-11-13 | Stop reason: SDUPTHER

## 2020-11-13 RX ORDER — NITROFURANTOIN 25; 75 MG/1; MG/1
100 CAPSULE ORAL 2 TIMES DAILY
Qty: 14 CAP | Refills: 0 | Status: CANCELLED | OUTPATIENT
Start: 2020-11-13 | End: 2020-11-20

## 2020-11-13 RX ORDER — NITROFURANTOIN 25; 75 MG/1; MG/1
100 CAPSULE ORAL 2 TIMES DAILY
Qty: 14 CAP | Refills: 0 | Status: SHIPPED
Start: 2020-11-13 | End: 2021-09-16

## 2020-11-13 RX ORDER — NITROFURANTOIN 25; 75 MG/1; MG/1
100 CAPSULE ORAL 2 TIMES DAILY
Qty: 14 CAP | Refills: 0 | Status: SHIPPED | OUTPATIENT
Start: 2020-11-13 | End: 2020-11-13 | Stop reason: SDUPTHER

## 2020-11-14 ENCOUNTER — HOSPITAL ENCOUNTER (OUTPATIENT)
Facility: MEDICAL CENTER | Age: 70
End: 2020-11-14
Attending: FAMILY MEDICINE
Payer: COMMERCIAL

## 2020-11-14 PROCEDURE — 82274 ASSAY TEST FOR BLOOD FECAL: CPT

## 2020-11-15 LAB
BACTERIA UR CULT: ABNORMAL
BACTERIA UR CULT: ABNORMAL
SIGNIFICANT IND 70042: ABNORMAL
SITE SITE: ABNORMAL
SOURCE SOURCE: ABNORMAL

## 2020-11-24 ENCOUNTER — APPOINTMENT (OUTPATIENT)
Dept: INTERNAL MEDICINE | Facility: IMAGING CENTER | Age: 70
End: 2020-11-24
Payer: COMMERCIAL

## 2020-11-25 ENCOUNTER — HOSPITAL ENCOUNTER (OUTPATIENT)
Facility: MEDICAL CENTER | Age: 70
End: 2020-11-25
Attending: FAMILY MEDICINE
Payer: COMMERCIAL

## 2020-11-25 ENCOUNTER — NON-PROVIDER VISIT (OUTPATIENT)
Dept: INTERNAL MEDICINE | Facility: IMAGING CENTER | Age: 70
End: 2020-11-25
Payer: COMMERCIAL

## 2020-11-25 DIAGNOSIS — N30.00 ACUTE CYSTITIS WITHOUT HEMATURIA: ICD-10-CM

## 2020-11-25 PROCEDURE — 81003 URINALYSIS AUTO W/O SCOPE: CPT

## 2020-11-26 LAB
APPEARANCE UR: CLEAR
BILIRUB UR QL STRIP.AUTO: NEGATIVE
COLOR UR: YELLOW
GLUCOSE UR STRIP.AUTO-MCNC: NEGATIVE MG/DL
KETONES UR STRIP.AUTO-MCNC: NEGATIVE MG/DL
LEUKOCYTE ESTERASE UR QL STRIP.AUTO: NEGATIVE
MICRO URNS: NORMAL
NITRITE UR QL STRIP.AUTO: NEGATIVE
PH UR STRIP.AUTO: 6 [PH] (ref 5–8)
PROT UR QL STRIP: NEGATIVE MG/DL
RBC UR QL AUTO: NEGATIVE
SP GR UR STRIP.AUTO: 1.01
UROBILINOGEN UR STRIP.AUTO-MCNC: 0.2 MG/DL

## 2020-11-27 LAB — HEMOCCULT STL QL IA: NEGATIVE

## 2021-01-16 DIAGNOSIS — Z23 NEED FOR VACCINATION: ICD-10-CM

## 2021-01-27 ENCOUNTER — IMMUNIZATION (OUTPATIENT)
Dept: FAMILY PLANNING/WOMEN'S HEALTH CLINIC | Facility: IMMUNIZATION CENTER | Age: 71
End: 2021-01-27
Attending: INTERNAL MEDICINE
Payer: COMMERCIAL

## 2021-01-27 DIAGNOSIS — Z23 ENCOUNTER FOR VACCINATION: Primary | ICD-10-CM

## 2021-01-27 DIAGNOSIS — Z23 NEED FOR VACCINATION: ICD-10-CM

## 2021-01-27 PROCEDURE — 0001A PFIZER SARS-COV-2 VACCINE: CPT

## 2021-01-27 PROCEDURE — 91300 PFIZER SARS-COV-2 VACCINE: CPT

## 2021-02-19 ENCOUNTER — IMMUNIZATION (OUTPATIENT)
Dept: FAMILY PLANNING/WOMEN'S HEALTH CLINIC | Facility: IMMUNIZATION CENTER | Age: 71
End: 2021-02-19
Attending: INTERNAL MEDICINE
Payer: COMMERCIAL

## 2021-02-19 DIAGNOSIS — Z23 ENCOUNTER FOR VACCINATION: Primary | ICD-10-CM

## 2021-02-19 PROCEDURE — 0002A PFIZER SARS-COV-2 VACCINE: CPT | Performed by: INTERNAL MEDICINE

## 2021-02-19 PROCEDURE — 91300 PFIZER SARS-COV-2 VACCINE: CPT | Performed by: INTERNAL MEDICINE

## 2021-08-25 ENCOUNTER — APPOINTMENT (OUTPATIENT)
Dept: INTERNAL MEDICINE | Facility: IMAGING CENTER | Age: 71
End: 2021-08-25
Payer: COMMERCIAL

## 2021-09-16 ENCOUNTER — OFFICE VISIT (OUTPATIENT)
Dept: INTERNAL MEDICINE | Facility: IMAGING CENTER | Age: 71
End: 2021-09-16
Payer: COMMERCIAL

## 2021-09-16 VITALS
RESPIRATION RATE: 14 BRPM | BODY MASS INDEX: 29.37 KG/M2 | TEMPERATURE: 96.9 F | HEIGHT: 64 IN | OXYGEN SATURATION: 94 % | SYSTOLIC BLOOD PRESSURE: 126 MMHG | HEART RATE: 88 BPM | WEIGHT: 172 LBS | DIASTOLIC BLOOD PRESSURE: 70 MMHG

## 2021-09-16 DIAGNOSIS — I48.0 PAROXYSMAL ATRIAL FIBRILLATION (HCC): Chronic | ICD-10-CM

## 2021-09-16 DIAGNOSIS — Z12.11 COLON CANCER SCREENING: ICD-10-CM

## 2021-09-16 DIAGNOSIS — R73.9 HYPERGLYCEMIA: ICD-10-CM

## 2021-09-16 DIAGNOSIS — Z12.83 SKIN CANCER SCREENING: ICD-10-CM

## 2021-09-16 DIAGNOSIS — Z85.3 HISTORY OF BREAST CANCER: ICD-10-CM

## 2021-09-16 DIAGNOSIS — R32 INCONTINENCE IN FEMALE: ICD-10-CM

## 2021-09-16 DIAGNOSIS — Z78.0 ASYMPTOMATIC POSTMENOPAUSAL STATUS: ICD-10-CM

## 2021-09-16 DIAGNOSIS — I48.91 ATRIAL FIBRILLATION, UNSPECIFIED TYPE (HCC): ICD-10-CM

## 2021-09-16 DIAGNOSIS — Z79.899 MEDICATION MANAGEMENT: ICD-10-CM

## 2021-09-16 DIAGNOSIS — E78.5 DYSLIPIDEMIA: ICD-10-CM

## 2021-09-16 DIAGNOSIS — N39.41 URGE INCONTINENCE: ICD-10-CM

## 2021-09-16 DIAGNOSIS — E55.9 VITAMIN D DEFICIENCY: ICD-10-CM

## 2021-09-16 PROCEDURE — 99215 OFFICE O/P EST HI 40 MIN: CPT | Performed by: FAMILY MEDICINE

## 2021-09-16 RX ORDER — MULTIVIT WITH MINERALS/LUTEIN
1000 TABLET ORAL DAILY
COMMUNITY

## 2021-09-16 ASSESSMENT — PATIENT HEALTH QUESTIONNAIRE - PHQ9: CLINICAL INTERPRETATION OF PHQ2 SCORE: 0

## 2021-09-16 ASSESSMENT — FIBROSIS 4 INDEX: FIB4 SCORE: 1.31

## 2021-09-17 NOTE — PROGRESS NOTES
"Chief Complaint   Patient presents with   • Establish Care       Subjective:     HPI:   Michelle Diego is an extremely pleasant 70 y.o. female here  to establish care and discuss the evaluation and management of:       Problem   Paroxysmal Atrial Fibrillation (Hcc)    Has not been to a cardiologist in a while she has been taking Pradaxa through a family member-who has been sharing her meds        Urge incontinence-she has had trouble holding her urine for over a year.  She has always done Kegel exercises  She has not been interested in taking medication      History of breast cancer-she was diagnosed at age 45 with bilateral breast cancer  She is status post bilateral mastectomy, chemo in 1996 which was followed by 5 years of tamoxifen  She has had no further issues        She is active in many foundations    She does not have a formal exercise routine, does not like going the gyms  She has a regular diet       Objective:     /70   Pulse 88   Temp 36.1 °C (96.9 °F) (Temporal)   Resp 14   Ht 1.626 m (5' 4.02\")   Wt 78 kg (172 lb)   SpO2 94%  Body mass index is 29.51 kg/m².    Physical Exam:  Physical Exam  Constitutional: Well-developed and well-nourished. Not diaphoretic. No distress.  Appearing younger than stated age  Skin: Skin is warm and dry. No rash noted.  Head: Atraumatic without lesions.  Eyes: Conjunctivae and extraocular motions are normal.   Ears:  External ears unremarkable.    Nose: Nares patent. Mucosa without edema or erythema. No discharge. No facial tenderness.     Neck: Supple, No thyromegaly present. No JVD  Cardiovascular: Regular rate and rhythm.   Chest: Effort normal. Clear to auscultation throughout. No adventitious sounds.   Abdomen:  without distention.  .  Extremities: No cyanosis, clubbing, erythema, nor edema.   Neurological: Alert and oriented x 3.    Psychiatric:  Behavior, mood, and affect are appropriate     Assessment and Plan:     The following treatment plan was " discussed/researched:  Paroxysmal atrial fibrillation (HCC)  She has been taking Pradaxa-from a family member  Referral made to cardiology    History of breast cancer  Stable, followed annually with mammograms    Incontinence in female  Referral made today to physical therapy for pelvic floor strengthening    1. Atrial fibrillation, unspecified type (HCC)  REFERRAL TO CARDIOLOGY   2. Asymptomatic postmenopausal status  DS-BONE DENSITY STUDY (DEXA)   3. Colon cancer screening  OCCULT BLOOD X3 (STOOL)   4. Skin cancer screening  REFERRAL TO DERMATOLOGY   5. Vitamin D deficiency  VITAMIN D,25 HYDROXY   6. Dyslipidemia  Lipid Profile    TSH   7. Hyperglycemia  Comp Metabolic Panel    HEMOGLOBIN A1C   8. Medication management  CBC WITH DIFFERENTIAL   9. Urge incontinence  REFERRAL TO PHYSICAL THERAPY                                                                                                                                                                                      Any change or worsening of signs or symptoms, patient encouraged to follow-up or report to emergency room for further evaluation. Patient verbalizes understanding and agrees.    Follow-Up: For next available annual wellness visit    PLEASE NOTE: This dictation was created using voice recognition software. I have made every reasonable attempt to correct obvious errors, but I expect that there are errors of grammar and possibly content that I did not discover before finalizing the note.      My total time spent caring for the patient on the day of the encounter was  greater than 40 minutes.   This includes obtaining history, reviewing chart, physical exam, patient education, reviewing outside records, placing orders, interpreting tests and coordinating care.

## 2021-09-21 ENCOUNTER — NON-PROVIDER VISIT (OUTPATIENT)
Dept: INTERNAL MEDICINE | Facility: IMAGING CENTER | Age: 71
End: 2021-09-21
Payer: COMMERCIAL

## 2021-09-21 ENCOUNTER — HOSPITAL ENCOUNTER (OUTPATIENT)
Facility: MEDICAL CENTER | Age: 71
End: 2021-09-21
Attending: FAMILY MEDICINE
Payer: COMMERCIAL

## 2021-09-21 DIAGNOSIS — E78.5 DYSLIPIDEMIA: ICD-10-CM

## 2021-09-21 DIAGNOSIS — R73.9 HYPERGLYCEMIA: ICD-10-CM

## 2021-09-21 DIAGNOSIS — E55.9 VITAMIN D DEFICIENCY: ICD-10-CM

## 2021-09-21 DIAGNOSIS — Z79.899 MEDICATION MANAGEMENT: ICD-10-CM

## 2021-09-21 PROCEDURE — 82306 VITAMIN D 25 HYDROXY: CPT

## 2021-09-21 PROCEDURE — 80061 LIPID PANEL: CPT

## 2021-09-21 PROCEDURE — 85025 COMPLETE CBC W/AUTO DIFF WBC: CPT

## 2021-09-21 PROCEDURE — 83036 HEMOGLOBIN GLYCOSYLATED A1C: CPT

## 2021-09-21 PROCEDURE — 84443 ASSAY THYROID STIM HORMONE: CPT

## 2021-09-21 PROCEDURE — 80053 COMPREHEN METABOLIC PANEL: CPT

## 2021-09-22 LAB
25(OH)D3 SERPL-MCNC: 45 NG/ML (ref 30–100)
ALBUMIN SERPL BCP-MCNC: 4.4 G/DL (ref 3.2–4.9)
ALBUMIN/GLOB SERPL: 1.4 G/DL
ALP SERPL-CCNC: 63 U/L (ref 30–99)
ALT SERPL-CCNC: 18 U/L (ref 2–50)
ANION GAP SERPL CALC-SCNC: 11 MMOL/L (ref 7–16)
AST SERPL-CCNC: 22 U/L (ref 12–45)
BASOPHILS # BLD AUTO: 1 % (ref 0–1.8)
BASOPHILS # BLD: 0.07 K/UL (ref 0–0.12)
BILIRUB SERPL-MCNC: 0.7 MG/DL (ref 0.1–1.5)
BUN SERPL-MCNC: 16 MG/DL (ref 8–22)
CALCIUM SERPL-MCNC: 9.6 MG/DL (ref 8.5–10.5)
CHLORIDE SERPL-SCNC: 99 MMOL/L (ref 96–112)
CHOLEST SERPL-MCNC: 202 MG/DL (ref 100–199)
CO2 SERPL-SCNC: 28 MMOL/L (ref 20–33)
CREAT SERPL-MCNC: 0.95 MG/DL (ref 0.5–1.4)
EOSINOPHIL # BLD AUTO: 0.39 K/UL (ref 0–0.51)
EOSINOPHIL NFR BLD: 5.8 % (ref 0–6.9)
ERYTHROCYTE [DISTWIDTH] IN BLOOD BY AUTOMATED COUNT: 46.2 FL (ref 35.9–50)
EST. AVERAGE GLUCOSE BLD GHB EST-MCNC: 123 MG/DL
GLOBULIN SER CALC-MCNC: 3.2 G/DL (ref 1.9–3.5)
GLUCOSE SERPL-MCNC: 93 MG/DL (ref 65–99)
HBA1C MFR BLD: 5.9 % (ref 4–5.6)
HCT VFR BLD AUTO: 50.2 % (ref 37–47)
HDLC SERPL-MCNC: 51 MG/DL
HGB BLD-MCNC: 16.1 G/DL (ref 12–16)
IMM GRANULOCYTES # BLD AUTO: 0.03 K/UL (ref 0–0.11)
IMM GRANULOCYTES NFR BLD AUTO: 0.4 % (ref 0–0.9)
LDLC SERPL CALC-MCNC: 114 MG/DL
LYMPHOCYTES # BLD AUTO: 1.6 K/UL (ref 1–4.8)
LYMPHOCYTES NFR BLD: 23.8 % (ref 22–41)
MCH RBC QN AUTO: 30.6 PG (ref 27–33)
MCHC RBC AUTO-ENTMCNC: 32.1 G/DL (ref 33.6–35)
MCV RBC AUTO: 95.4 FL (ref 81.4–97.8)
MONOCYTES # BLD AUTO: 0.82 K/UL (ref 0–0.85)
MONOCYTES NFR BLD AUTO: 12.2 % (ref 0–13.4)
NEUTROPHILS # BLD AUTO: 3.81 K/UL (ref 2–7.15)
NEUTROPHILS NFR BLD: 56.8 % (ref 44–72)
NRBC # BLD AUTO: 0 K/UL
NRBC BLD-RTO: 0 /100 WBC
PLATELET # BLD AUTO: 273 K/UL (ref 164–446)
PMV BLD AUTO: 10.5 FL (ref 9–12.9)
POTASSIUM SERPL-SCNC: 4.2 MMOL/L (ref 3.6–5.5)
PROT SERPL-MCNC: 7.6 G/DL (ref 6–8.2)
RBC # BLD AUTO: 5.26 M/UL (ref 4.2–5.4)
SODIUM SERPL-SCNC: 138 MMOL/L (ref 135–145)
TRIGL SERPL-MCNC: 185 MG/DL (ref 0–149)
TSH SERPL DL<=0.005 MIU/L-ACNC: 1.65 UIU/ML (ref 0.38–5.33)
WBC # BLD AUTO: 6.7 K/UL (ref 4.8–10.8)

## 2021-09-28 ENCOUNTER — OFFICE VISIT (OUTPATIENT)
Dept: INTERNAL MEDICINE | Facility: IMAGING CENTER | Age: 71
End: 2021-09-28
Payer: COMMERCIAL

## 2021-09-28 VITALS
RESPIRATION RATE: 14 BRPM | TEMPERATURE: 97.5 F | WEIGHT: 171 LBS | HEIGHT: 64 IN | OXYGEN SATURATION: 95 % | BODY MASS INDEX: 29.19 KG/M2 | DIASTOLIC BLOOD PRESSURE: 68 MMHG | SYSTOLIC BLOOD PRESSURE: 118 MMHG | HEART RATE: 99 BPM

## 2021-09-28 DIAGNOSIS — Z00.00 WELLNESS EXAMINATION: ICD-10-CM

## 2021-09-28 DIAGNOSIS — R73.03 PREDIABETES: ICD-10-CM

## 2021-09-28 DIAGNOSIS — Z23 NEED FOR VACCINATION: ICD-10-CM

## 2021-09-28 DIAGNOSIS — I48.0 PAROXYSMAL ATRIAL FIBRILLATION (HCC): ICD-10-CM

## 2021-09-28 DIAGNOSIS — E78.5 DYSLIPIDEMIA: ICD-10-CM

## 2021-09-28 PROCEDURE — 90662 IIV NO PRSV INCREASED AG IM: CPT | Performed by: FAMILY MEDICINE

## 2021-09-28 PROCEDURE — 99397 PER PM REEVAL EST PAT 65+ YR: CPT | Mod: 25 | Performed by: FAMILY MEDICINE

## 2021-09-28 PROCEDURE — 90471 IMMUNIZATION ADMIN: CPT | Performed by: FAMILY MEDICINE

## 2021-09-28 RX ORDER — DABIGATRAN ETEXILATE 150 MG/1
150 CAPSULE ORAL 2 TIMES DAILY
Qty: 180 CAPSULE | Refills: 3 | Status: SHIPPED | OUTPATIENT
Start: 2021-09-28 | End: 2022-01-06 | Stop reason: SDUPTHER

## 2021-09-28 ASSESSMENT — FIBROSIS 4 INDEX: FIB4 SCORE: 1.35

## 2021-10-01 NOTE — PROGRESS NOTES
Subjective:     CC:   Chief Complaint   Patient presents with   • Annual Exam       HPI:   Michelle Diego is a 70 y.o. female who presents for annual exam    Patient has GYN provider: No      H/O Abnormal Pap: No     Last Bone Density Test:pending  Last Colorectal Cancer Screening: stool tests UTD   Last Tdap:         Exercise: no regular exercise , active  Diet: reg      No LMP recorded. Patient is postmenopausal.  She has not utilized hormone replacement therapy.  Denies any menopausal symptoms.  No significant bloating/fluid retention, pelvic pain, or dyspareunia. No abnormal vaginal discharge.   No breast tenderness, mass, nipple discharge or changes in size or contour.    OB History   No obstetric history on file.      She  reports previously being sexually active and has had partner(s) who are male.    She  has a past medical history of Atrial fibrillation (HCC), Breast cancer (), Cancer (HCC) (), Cataract, Chickenpox, Cold (2016), History of breast cancer (2016), Influenza, Kidney stone, Mumps, Nasal drainage, Paroxysmal atrial fibrillation (HCC), Pneumonia, Smallpox, and Tonsillitis.  She  has a past surgical history that includes other orthopedic surgery; recovery (2012); recovery (2/15/2013); other; other; tonsillectomy; and lumpectomy.    Family History   Problem Relation Age of Onset   • Heart Disease Mother    • Stroke Mother    • Cancer Paternal Grandmother         breast   • Heart Failure Sister         atrial fibrillation and CHF   • Heart Disease Maternal Grandmother      Social History     Tobacco Use   • Smoking status: Former Smoker     Packs/day: 2.00     Years: 44.00     Pack years: 88.00     Types: Cigarettes     Quit date: 2012     Years since quittin.6   • Smokeless tobacco: Never Used   Substance Use Topics   • Alcohol use: Yes     Comment: social    • Drug use: No       Patient Active Problem List    Diagnosis Date Noted   • Abnormal urinalysis  11/13/2020   • Incontinence in female 11/12/2020   • Right leg pain 11/12/2020   • Nocturnal hypoxemia 03/07/2017   • Former smoker 03/07/2017   • History of breast cancer 07/29/2016   • Dyslipidemia 10/31/2012   • Paroxysmal atrial fibrillation (HCC)    • Nephrolithiasis 10/29/2012     Current Outpatient Medications   Medication Sig Dispense Refill   • dabigatran (PRADAXA) 150 MG Cap capsule Take 1 Capsule by mouth 2 times a day. 180 Capsule 3   • Multiple Minerals-Vitamins (CALCIUM-MAGNESIUM-ZINC-D3 PO) Take  by mouth.     • vitamin E (VITAMIN E) 1000 Unit (450 mg) Cap Take 1,000 Units by mouth every day.     • B Complex Vitamins (B COMPLEX 50 PO) Take  by mouth.     • NON SPECIFIED avila     • Safety Seal Miscellaneous Misc Inflamazol 1 capsule once daily     • Calcium Citrate-Vitamin D (CALCIUM + D PO) Take 1,200 mg by mouth.     • vitamin D (CHOLECALCIFEROL) 1000 UNIT Tab Take 2,000 Units by mouth every day.     • Probiotic Product (PROBIOTIC PO) Take 1 Tab by mouth every day.         No current facility-administered medications for this visit.     Allergies   Allergen Reactions   • Blackberry Flavor Anaphylaxis     anaphylaxis   • Blueberry Flavor Anaphylaxis     anaphylaxis   • Food Anaphylaxis     All Berries     • Raspberry Anaphylaxis     anaphylaxis   • Strawberry Anaphylaxis     anaphylaxis   • Iodine Swelling     Swelling- IV  Contrast, topical OK   • Vicodin [Hydrocodone-Acetaminophen] Vomiting     sick       Review of Systems    Constitutional: Negative for fever, chills and malaise/fatigue.   HENT: Negative for congestion.    Eyes: Negative for pain.   Respiratory: Negative for cough and shortness of breath.    Cardiovascular: Negative for chest pain and leg swelling.   Gastrointestinal: Negative for nausea, vomiting, abdominal pain and diarrhea.   Genitourinary: Negative for dysuria and hematuria.   Skin: Negative for rash.   Neurological: Negative for dizziness, focal weakness and headaches.  "  Endo/Heme/Allergies: Does not bruise/bleed easily.   Psychiatric/Behavioral: Negative for depression.  The patient is not nervous/anxious.      Objective:   /68   Pulse 99   Temp 36.4 °C (97.5 °F) (Temporal)   Resp 14   Ht 1.626 m (5' 4.02\")   Wt 77.6 kg (171 lb)   SpO2 95%   BMI 29.34 kg/m²     Wt Readings from Last 4 Encounters:   09/28/21 77.6 kg (171 lb)   09/16/21 78 kg (172 lb)   11/12/20 78 kg (172 lb)   03/07/17 81.2 kg (179 lb)            Physical Exam:   Constitutional: Well-developed and well-nourished. Not diaphoretic. No distress.   Skin: Skin is warm and dry. No rash noted.  Head: Atraumatic without lesions.  Eyes: Conjunctivae and extraocular motions are normal. Pupils are equal, round, and reactive to light. No scleral icterus.   Ears:  External ears unremarkable. Tympanic membranes clear and intact.  Nose: Nares patent. Septum midline. Turbinates without erythema nor edema. No discharge.   Mouth/Throat: Tongue normal. Oropharynx is clear and moist. Posterior pharynx without erythema or exudates.  Neck: Supple, trachea midline. Normal range of motion. No thyromegaly present. No lymphadenopathy--cervical or supraclavicular.  Cardiovascular: Regular rate and rhythm, S1 and S2 without murmur, rubs, or gallops.    Respiratory: Effort normal. Clear to auscultation throughout. No adventitious sounds.   Breast: Breasts examined   Supine.  Status post breast reconstruction   no skin changes, peau d'orange or nipple retraction. No discharge. No axillary or supraclavicular adenopathy. No masses or nodularity palpable.  Abdomen: Soft, non tender, and without distention. Active bowel sounds in all four quadrants. No rebound, guarding, masses or HSM.    Extremities: No cyanosis, clubbing, erythema, nor edema. Distal pulses intact and symmetric.   Musculoskeletal: All major joints AROM full in all directions without pain.  Neurological: Alert and oriented x 3. Grossly non-focal. Strength and " sensation grossly intact. DTRs 2+/3 and symmetric.   Psychiatric:  Behavior, mood, and affect are appropriate.  The 10-year ASCVD risk score ([place] MATHIEU Singh., et al., 2013) is: 9.2%      Lab Results   Component Value Date/Time    CHOLSTRLTOT 202 (H) 09/21/2021 0830    TRIGLYCERIDE 185 (H) 09/21/2021 0830    HDL 51 09/21/2021 0830     (H) 09/21/2021 0830         Results for NICOLE NGUYEN (MRN [id number]) as of 10/1/2021 10:20   Ref. Range 9/21/2021 08:30   WBC Latest Ref Range: 4.8 - 10.8 K/uL 6.7   RBC Latest Ref Range: 4.20 - 5.40 M/uL 5.26   Hemoglobin Latest Ref Range: 12.0 - 16.0 g/dL 16.1 (H)   Hematocrit Latest Ref Range: 37.0 - 47.0 % 50.2 (H)   MCV Latest Ref Range: 81.4 - 97.8 fL 95.4   MCH Latest Ref Range: 27.0 - 33.0 pg 30.6   MCHC Latest Ref Range: 33.6 - 35.0 g/dL 32.1 (L)   RDW Latest Ref Range: 35.9 - 50.0 fL 46.2   Platelet Count Latest Ref Range: 164 - 446 K/uL 273   MPV Latest Ref Range: 9.0 - 12.9 fL 10.5   Neutrophils-Polys Latest Ref Range: 44.00 - 72.00 % 56.80   Neutrophils (Absolute) Latest Ref Range: 2.00 - 7.15 K/uL 3.81   Lymphocytes Latest Ref Range: 22.00 - 41.00 % 23.80   Lymphs (Absolute) Latest Ref Range: 1.00 - 4.80 K/uL 1.60   Monocytes Latest Ref Range: 0.00 - 13.40 % 12.20   Monos (Absolute) Latest Ref Range: 0.00 - 0.85 K/uL 0.82   Eosinophils Latest Ref Range: 0.00 - 6.90 % 5.80   Eos (Absolute) Latest Ref Range: 0.00 - 0.51 K/uL 0.39   Basophils Latest Ref Range: 0.00 - 1.80 % 1.00   Baso (Absolute) Latest Ref Range: 0.00 - 0.12 K/uL 0.07   Immature Granulocytes Latest Ref Range: 0.00 - 0.90 % 0.40   Immature Granulocytes (abs) Latest Ref Range: 0.00 - 0.11 K/uL 0.03   Nucleated RBC Latest Units: /100 WBC 0.00   NRBC (Absolute) Latest Units: K/uL 0.00   Sodium Latest Ref Range: 135 - 145 mmol/L 138   Potassium Latest Ref Range: 3.6 - 5.5 mmol/L 4.2   Chloride Latest Ref Range: 96 - 112 mmol/L 99   Co2 Latest Ref Range: 20 - 33 mmol/L 28   Anion Gap Latest Ref Range: 7.0 -  16.0  11.0   Glucose Latest Ref Range: 65 - 99 mg/dL 93   Bun Latest Ref Range: 8 - 22 mg/dL 16   Creatinine Latest Ref Range: 0.50 - 1.40 mg/dL 0.95   GFR If  Latest Ref Range: >60 mL/min/1.73 m 2 >60   GFR If Non  Latest Ref Range: >60 mL/min/1.73 m 2 58 (A)   Calcium Latest Ref Range: 8.5 - 10.5 mg/dL 9.6   AST(SGOT) Latest Ref Range: 12 - 45 U/L 22   ALT(SGPT) Latest Ref Range: 2 - 50 U/L 18   Alkaline Phosphatase Latest Ref Range: 30 - 99 U/L 63   Total Bilirubin Latest Ref Range: 0.1 - 1.5 mg/dL 0.7   Albumin Latest Ref Range: 3.2 - 4.9 g/dL 4.4   Total Protein Latest Ref Range: 6.0 - 8.2 g/dL 7.6   Globulin Latest Ref Range: 1.9 - 3.5 g/dL 3.2   A-G Ratio Latest Units: g/dL 1.4   Glycohemoglobin Latest Ref Range: 4.0 - 5.6 % 5.9 (H)   Estim. Avg Glu Latest Units: mg/dL 123     Results for NICOLE NGUYEN (MRN [id number]) as of 10/1/2021 10:20   Ref. Range 9/21/2021 08:30   25-Hydroxy   Vitamin D 25 Latest Ref Range: 30 - 100 ng/mL 45   TSH Latest Ref Range: 0.380 - 5.330 uIU/mL 1.650       Assessment and Plan:     1. Wellness examination    2. Need for vaccination  - INFLUENZA VACCINE, HIGH DOSE (65+ ONLY)    3. Paroxysmal atrial fibrillation (HCC)  has upcoming appointment to establish with cardiologist-  - dabigatran (PRADAXA) 150 MG Cap capsule; Take 1 Capsule by mouth 2 times a day.  Dispense: 180 Capsule; Refill: 3    4. Dyslipidemia-declined statin currently but will discuss with cardiologist at upcoming appointment      5.  Prediabetes-discussed appropriate diet and exercise recommend rechecking in 6 months,    Health maintenance:     Labs per orders  Immunizations per orders  Patient counseled about skin care, diet, supplements, and exercise.  Discussed  mammography screening, diet and exercise     Follow-up: As needed /annually

## 2021-10-19 ENCOUNTER — HOSPITAL ENCOUNTER (OUTPATIENT)
Dept: RADIOLOGY | Facility: MEDICAL CENTER | Age: 71
End: 2021-10-19
Attending: FAMILY MEDICINE
Payer: COMMERCIAL

## 2021-10-19 DIAGNOSIS — Z78.0 ASYMPTOMATIC POSTMENOPAUSAL STATUS: ICD-10-CM

## 2021-10-19 PROCEDURE — 77080 DXA BONE DENSITY AXIAL: CPT

## 2021-12-21 ENCOUNTER — OFFICE VISIT (OUTPATIENT)
Dept: DERMATOLOGY | Facility: IMAGING CENTER | Age: 71
End: 2021-12-21
Payer: COMMERCIAL

## 2021-12-21 DIAGNOSIS — L57.0 ACTINIC KERATOSES: ICD-10-CM

## 2021-12-21 DIAGNOSIS — D22.9 NEVUS: ICD-10-CM

## 2021-12-21 DIAGNOSIS — D18.01 CHERRY ANGIOMA: ICD-10-CM

## 2021-12-21 PROCEDURE — 17000 DESTRUCT PREMALG LESION: CPT | Performed by: NURSE PRACTITIONER

## 2021-12-21 PROCEDURE — 99203 OFFICE O/P NEW LOW 30 MIN: CPT | Mod: 25 | Performed by: NURSE PRACTITIONER

## 2021-12-21 NOTE — PROGRESS NOTES
DERMATOLOGY NOTE  NEW VISIT       Chief complaint: Establish Care (EREN ) and Skin Lesion       HPI: skin lesion   Location: left cheek   Time present: 7 months   Painful lesion: No  Itching lesion: No  Enlarging lesion: No  Anything make it better or worse?no     HPI: mole   Location: chest   Time present: unknown   Painful lesion: No  Itching lesion: No  Enlarging lesion: No  Anything make it better or worse?no   HPI:  Skin lesion   Location: lower back   Time present:  2-3 years   Painful lesion: No  Itching lesion: Yes  Enlarging lesion: No  Anything make it better or worse?no       History of skin cancer: no   History of precancers/actinic keratoses: Yes, Details: neck 2004   History of biopsies:Yes, Details: AK on neck 2004  History of blistering/severe sunburns:No  Family history of skin cancer:No  Family history of atypical moles:No      Allergies   Allergen Reactions   • Blackberry Flavor Anaphylaxis     anaphylaxis   • Blueberry Flavor Anaphylaxis     anaphylaxis   • Food Anaphylaxis     All Berries     • Raspberry Anaphylaxis     anaphylaxis   • Strawberry Anaphylaxis     anaphylaxis   • Iodine Swelling     Swelling- IV  Contrast, topical OK   • Vicodin [Hydrocodone-Acetaminophen] Vomiting     sick        MEDICATIONS:  Medications relevant to specialty reviewed.     REVIEW OF SYSTEMS:   Positive for skin (see HPI)  Negative for fevers and chills       EXAM:  There were no vitals taken for this visit.  Constitutional: Well-developed, well-nourished, and in no distress.     A focused cutaneous exam was completed including: face, chest and lower back with the following pertinent findings listed below. Remaining above-listed examined areas within normal limits / negative for rashes or lesions.    ak to left malar area  Flesh colored nevus to right lower back  One cherry angioma to chest       IMPRESSION / PLAN:    1. Actinic keratoses  CRYOTHERAPY:  Risks (including, but not limited to: hypo or  hyperpigmentation, redness, blister, blood blister, recurrence, need for further treatment, infection, scar) and benefits of cryotherapy discussed. Patient verbally agreed to proceed with treatment. 2 cryotherapy freeze thaw cycles of 10 seconds were applied to 1 lesion on as noted in exam with cryac. Patient tolerated procedure well. Aftercare instructions given.      2. Cherry angioma  - Benign-appearing nature of lesions discussed. Advised to return to clinic for any new or concerning changes.      3. Nevus  - Benign-appearing nature of lesions discussed. Advised to return to clinic for any new or concerning changes.  - ABCDE's of melanoma discussed       Please note that this dictation was created using voice recognition software. I have made every reasonable attempt to correct obvious errors, but I expect that there are errors of grammar and possibly content that I did not discover before finalizing the note.      Return to clinic in: Return in about 2 months (around 2/21/2022) for ak re-check. and as needed for any new or changing skin lesions.

## 2021-12-30 ENCOUNTER — TELEPHONE (OUTPATIENT)
Dept: CARDIOLOGY | Facility: MEDICAL CENTER | Age: 71
End: 2021-12-30

## 2021-12-30 NOTE — TELEPHONE ENCOUNTER
Spoke with pt who confirmed NP with Corie VOGEL. Per pt has not seen any other cardiology outside of [facility]. Per pt has not has any recent hospitalizations outside of [facility]. Confirmed with pt that recent lab work and cardiac testing is in pts chart.   Pt is lorelei to see 01/06/22 on 0830.   Appointment time, location and date confirmed with pt.

## 2022-01-06 ENCOUNTER — OFFICE VISIT (OUTPATIENT)
Dept: CARDIOLOGY | Facility: MEDICAL CENTER | Age: 72
End: 2022-01-06
Attending: FAMILY MEDICINE
Payer: COMMERCIAL

## 2022-01-06 VITALS
WEIGHT: 166 LBS | BODY MASS INDEX: 28.34 KG/M2 | SYSTOLIC BLOOD PRESSURE: 142 MMHG | OXYGEN SATURATION: 96 % | DIASTOLIC BLOOD PRESSURE: 80 MMHG | HEART RATE: 110 BPM | RESPIRATION RATE: 16 BRPM | HEIGHT: 64 IN

## 2022-01-06 DIAGNOSIS — E78.5 DYSLIPIDEMIA: ICD-10-CM

## 2022-01-06 DIAGNOSIS — I48.0 PAROXYSMAL ATRIAL FIBRILLATION (HCC): ICD-10-CM

## 2022-01-06 LAB — EKG IMPRESSION: NORMAL

## 2022-01-06 PROCEDURE — 99203 OFFICE O/P NEW LOW 30 MIN: CPT | Performed by: NURSE PRACTITIONER

## 2022-01-06 PROCEDURE — 93000 ELECTROCARDIOGRAM COMPLETE: CPT | Performed by: INTERNAL MEDICINE

## 2022-01-06 RX ORDER — ROSUVASTATIN CALCIUM 10 MG/1
10 TABLET, COATED ORAL EVERY EVENING
Qty: 30 TABLET | Refills: 11 | Status: SHIPPED | OUTPATIENT
Start: 2022-01-06 | End: 2022-07-17

## 2022-01-06 RX ORDER — DABIGATRAN ETEXILATE 150 MG/1
150 CAPSULE ORAL 2 TIMES DAILY
Qty: 180 CAPSULE | Refills: 3 | Status: SHIPPED | OUTPATIENT
Start: 2022-01-06 | End: 2022-11-02 | Stop reason: SDUPTHER

## 2022-01-06 RX ORDER — METOPROLOL SUCCINATE 25 MG/1
25 TABLET, EXTENDED RELEASE ORAL DAILY
Qty: 90 TABLET | Refills: 3 | Status: SHIPPED | OUTPATIENT
Start: 2022-01-06 | End: 2022-11-02 | Stop reason: SDUPTHER

## 2022-01-06 ASSESSMENT — FIBROSIS 4 INDEX: FIB4 SCORE: 1.37

## 2022-01-06 NOTE — PROGRESS NOTES
Cardiology New Consultation Note    Date of note:    1/6/2022  Primary Care Provider: Kenna Asif M.D.    Name:             Michelle Diego  YOB: 1950  MRN:               [id number]    CC: Re-establish with cardiology for paroxysmal atrial      Patient HPI:   Michelle Diego is a 71 y.o. female with current medical problems including paroxysmal atrial fibrillation, dyslipidemia, last saw Dr. Brady in 2016.    Review of last note she had cardioversion in 2016 went back into A. fib, had been on flecainide for rhythm control in the past.  She has remained on appropriate anticoagulation with Pradaxa.    She also had ablation by Dr. Polanco in @ 2012, has had many cardioversions.  Ablation lasted 5 days.  Cardioversion only lasted a few hours she was back in atrial fibrillation    She denies chest pain, shortness of breath, dyspnea on exertion, dizziness or syncopal episodes, orthopnea, PND, lower extremity swelling, and recent weight gain.     She feels like she is in Afib 95%, on a rare occasion she does feel she has a normal rate    Bp at home is 120/70's, feels it is elevated today in office due to the anxiety of coming back to see the cardiologist after 6 years.    Patient endorses medication compliance.  She has been able to remain on Pradaxa.  Not on any medication for rate control at this time    Cardiovascular Risk Factors:  1. Smoking status: former, 88 pk yr Hx  2. Type II Diabetes Mellitus: prediabetic   Lab Results   Component Value Date/Time    HBA1C 5.9 (H) 09/21/2021 08:30 AM    HBA1C 5.3 11/06/2020 09:30 AM     3. Hypertension: No  4. Dyslipidemia: Yes, no medication  Cholesterol,Tot   Date Value Ref Range Status   09/21/2021 202 (H) 100 - 199 mg/dL Final     LDL   Date Value Ref Range Status   09/21/2021 114 (H) <100 mg/dL Final     HDL   Date Value Ref Range Status   09/21/2021 51 >=40 mg/dL Final     Triglycerides   Date Value Ref Range Status   09/21/2021 185 (H) 0 - 149 mg/dL Final      5. Family history of early Coronary Artery Disease in a first degree relative (Male less than 55 years of age; Female less than 65 years of age): No  6.  Obesity and/or Metabolic Syndrome: no  7. Sedentary lifestyle: no    Review of systems:  All others systems reviewed and negative except for what is outlined in the above HPI    Past Medical History:   Diagnosis Date   • Atrial fibrillation (HCC)    • Breast cancer (HCC)    • Cancer (HCC)     Bilat breast , chemo and reconstruction   • Cataract     bilateral   • Chickenpox    • Cold 2016    resolved    • History of breast cancer 2016    Bilateral--status post bilat mastectomy, chemo.  Tamoxifen x 5 yr.    • Influenza    • Kidney stone    • Mumps    • Nasal drainage    • Paroxysmal atrial fibrillation (HCC)    • Pneumonia    • Smallpox    • Tonsillitis      Past Surgical History:   Procedure Laterality Date   • RECOVERY  2/15/2013    Performed by [facility] at [facility]   • RECOVERY  2012    Performed by [facility]Recovery Surgery at [facility]   • LUMPECTOMY      Breast cancer reconstruction   • OTHER      Bilat breast recon  gilbert flap   • OTHER      T&A   • OTHER ORTHOPEDIC SURGERY      Recon hand surg   • TONSILLECTOMY       Family History   Problem Relation Age of Onset   • Heart Disease Mother    • Stroke Mother    • Cancer Paternal Grandmother         breast   • Heart Failure Sister         atrial fibrillation and CHF   • Heart Disease Maternal Grandmother      Social History     Socioeconomic History   • Marital status: Single     Spouse name: Not on file   • Number of children: Not on file   • Years of education: Not on file   • Highest education level: Not on file   Occupational History   • Not on file   Tobacco Use   • Smoking status: Former Smoker     Packs/day: 2.00     Years: 44.00     Pack years: 88.00     Types: Cigarettes     Quit date: 2012     Years since quittin.9    • Smokeless tobacco: Never Used   Substance and Sexual Activity   • Alcohol use: Yes     Comment: social    • Drug use: No   • Sexual activity: Not Currently     Partners: Male     Comment:    Other Topics Concern   • Not on file   Social History Narrative         Social Determinants of Health     Financial Resource Strain:    • Difficulty of Paying Living Expenses: Not on file   Food Insecurity:    • Worried About Running Out of Food in the Last Year: Not on file   • Ran Out of Food in the Last Year: Not on file   Transportation Needs:    • Lack of Transportation (Medical): Not on file   • Lack of Transportation (Non-Medical): Not on file   Physical Activity:    • Days of Exercise per Week: Not on file   • Minutes of Exercise per Session: Not on file   Stress:    • Feeling of Stress : Not on file   Social Connections:    • Frequency of Communication with Friends and Family: Not on file   • Frequency of Social Gatherings with Friends and Family: Not on file   • Attends Orthodox Services: Not on file   • Active Member of Clubs or Organizations: Not on file   • Attends Club or Organization Meetings: Not on file   • Marital Status: Not on file   Intimate Partner Violence:    • Fear of Current or Ex-Partner: Not on file   • Emotionally Abused: Not on file   • Physically Abused: Not on file   • Sexually Abused: Not on file   Housing Stability:    • Unable to Pay for Housing in the Last Year: Not on file   • Number of Places Lived in the Last Year: Not on file   • Unstable Housing in the Last Year: Not on file     Allergies   Allergen Reactions   • Blackberry Flavor Anaphylaxis     anaphylaxis   • Blueberry Flavor Anaphylaxis     anaphylaxis   • Food Anaphylaxis     All Berries     • Raspberry Anaphylaxis     anaphylaxis   • Strawberry Anaphylaxis     anaphylaxis   • Iodine Swelling     Swelling- IV  Contrast, topical OK   • Vicodin [Hydrocodone-Acetaminophen] Vomiting     sick     Current Outpatient  "Medications   Medication Sig Dispense Refill   • Non Formulary Request Take  by mouth every day. [place]- PO     • metoprolol SR (TOPROL XL) 25 MG TABLET SR 24 HR Take 1 Tablet by mouth every day. 90 Tablet 3   • dabigatran (PRADAXA) 150 MG Cap capsule Take 1 Capsule by mouth 2 times a day. 180 Capsule 3   • Multiple Minerals-Vitamins (CALCIUM-MAGNESIUM-ZINC-D3 PO) Take  by mouth.     • vitamin E (VITAMIN E) 1000 Unit (450 mg) Cap Take 1,000 Units by mouth every day.     • B Complex Vitamins (B COMPLEX 50 PO) Take 50 mg by mouth every day.     • Safety Seal Miscellaneous Misc Inflamazol 1 capsule once daily     • Calcium Citrate-Vitamin D (CALCIUM + D PO) Take 1,200 mg by mouth.     • vitamin D (CHOLECALCIFEROL) 1000 UNIT Tab Take 2,000 Units by mouth every day.     • Probiotic Product (PROBIOTIC PO) Take 1 Tab by mouth every day.       • NON SPECIFIED avila       No current facility-administered medications for this visit.       Physical Exam:  Ambulatory Vitals  /80 (BP Location: Left arm, Patient Position: Sitting, BP Cuff Size: Adult)   Pulse (!) 110   Resp 16   Ht 1.626 m (5' 4\")   Wt 75.3 kg (166 lb)   SpO2 96%    BP Readings from Last 4 Encounters:   01/06/22 142/80   09/28/21 118/68   09/16/21 126/70   11/12/20 140/78       Weight/BMI: Body mass index is 28.49 kg/m².  Wt Readings from Last 4 Encounters:   01/06/22 75.3 kg (166 lb)   09/28/21 77.6 kg (171 lb)   09/16/21 78 kg (172 lb)   11/12/20 78 kg (172 lb)     General: No apparent distress. Well nourished.   Neck: No JVD. No caroid bruits, trachea midline  Lungs: CTAB. Normal effort, without crackles/rhonchi, no wheezing  Heart: Irregularly irregular. Normal S1/S2, no murmur, no rub. no lower extremity edema. 2+ radial pulses, 2+ DT pulses  Ext: No clubbing or cyanosis.  Abdomen: soft, non tender, non distended, no romeo hepatomegaly.  Neurological: No focal deficits, no facial asymmetry.  Normal speech.  Psychiatric: Appropriate affect, alert " and oriented x 4.   Skin: Warm and dry, no rash.    Lab Data Review:  Lab Results   Component Value Date/Time    CHOLSTRLTOT 202 (H) 2021 08:30 AM     (H) 2021 08:30 AM    HDL 51 2021 08:30 AM    TRIGLYCERIDE 185 (H) 2021 08:30 AM       Lab Results   Component Value Date/Time    SODIUM 138 2021 08:30 AM    POTASSIUM 4.2 2021 08:30 AM    CHLORIDE 99 2021 08:30 AM    CO2 28 2021 08:30 AM    GLUCOSE 93 2021 08:30 AM    BUN 16 2021 08:30 AM    CREATININE 0.95 2021 08:30 AM     Lab Results   Component Value Date/Time    ALKPHOSPHAT 63 2021 08:30 AM    ASTSGOT 22 2021 08:30 AM    ALTSGPT 18 2021 08:30 AM    TBILIRUBIN 0.7 2021 08:30 AM      Lab Results   Component Value Date/Time    WBC 6.7 2021 08:30 AM       Cardiac Imaging and Procedures Review:      EKG 21: My Personal interpretation reveals     Echo 2016:  Normal left ventricular size and systolic function.  Left ventricular ejection fraction is visually estimated to be 70%.  Diastolic function is difficult to assess with atrial fibrillation.  Normal right ventricular size and systolic function.  Mild mitral regurgitation.  Compared to the images of the prior study done on 12, there has   been no significant change.      Assessment and Clinical Decision Makin. Paroxysmal atrial fibrillation (HCC)  EKG    EC-ECHOCARDIOGRAM COMPLETE W/O CONT    metoprolol SR (TOPROL XL) 25 MG TABLET SR 24 HR    dabigatran (PRADAXA) 150 MG Cap capsule    REFERRAL TO CARDIOLOGY     The following treatment plan was discussed    Atrial fibrillation  -Type: Chronic, in Afib rate 's   -Rate control: Initiate metoprolol SR 25 mg daily  -Symptoms: She feels she tolerates atrial fibrillation quite well after all these years  -[id number] Score: 2   -Risk Factors: advanced age and family hx (all her sisters have atrial fibrillation)  -Further Testing Recommended:  Echo  -Anticoagulation: Pradaxa 150 mg twice daily  -EP consult given history of multiple cardioversions and ablation without success  -Continue to monitor home BP, most likely elevated today due to anxiety of returning to see cardiologist after 6 years    Dyslipidemia  -The 10-year ASCVD risk score (C[place] MATHIEU Jr., et al., 2013) is: 10.6%  -Will start on rosuvastatin 10mg daily  -F/U lipid panel yearly    Plan reviewed in detail with the patient, verbalizes understanding and is in agreement.  Pt is to follow up with myself in 6 months, with EP asap    Encouraged Pt to follow up with us over the phone or electronically using my [employer] as cardiac issues/concerns arise.      PLEASE NOTE: This dictation was created using voice recognition software. I have made every reasonable attempt to correct obvious errors, but I expect that there are errors of grammar and possibly content that I did not discover before finalizing the note.       MATTHIAS Mackey   [facility]  [phone]    Collaborating Physician: Dr. Youssef

## 2022-01-06 NOTE — PATIENT INSTRUCTIONS
Dyslipidemia  Dyslipidemia is an imbalance of waxy, fat-like substances (lipids) in the blood. The body needs lipids in small amounts. Dyslipidemia often involves a high level of cholesterol or triglycerides, which are types of lipids.  Common forms of dyslipidemia include:  · High levels of LDL cholesterol. LDL is the type of cholesterol that causes fatty deposits (plaques) to build up in the blood vessels that carry blood away from your heart (arteries).  · Low levels of HDL cholesterol. HDL cholesterol is the type of cholesterol that protects against heart disease. High levels of HDL remove the LDL buildup from arteries.  · High levels of triglycerides. Triglycerides are a fatty substance in the blood that is linked to a buildup of plaques in the arteries.  What are the causes?  Primary dyslipidemia is caused by changes (mutations) in genes that are passed down through families (inherited). These mutations cause several types of dyslipidemia.  Secondary dyslipidemia is caused by lifestyle choices and diseases that lead to dyslipidemia, such as:  · Eating a diet that is high in animal fat.  · Not getting enough exercise.  · Having diabetes, kidney disease, liver disease, or thyroid disease.  · Drinking large amounts of alcohol.  · Using certain medicines.  What increases the risk?  You are more likely to develop this condition if you are an older man or if you are a woman who has gone through menopause. Other risk factors include:  · Having a family history of dyslipidemia.  · Taking certain medicines, including birth control pills, steroids, some diuretics, and beta-blockers.  · Smoking cigarettes.  · Eating a high-fat diet.  · Having certain medical conditions such as diabetes, polycystic ovary syndrome (PCOS), kidney disease, liver disease, or hypothyroidism.  · Not exercising regularly.  · Being overweight or obese with too much belly fat.  What are the signs or symptoms?  In most cases, dyslipidemia does not  usually cause any symptoms.  In severe cases, very high lipid levels can cause:  · Fatty bumps under the skin (xanthomas).  · White or gray ring around the black center (pupil) of the eye.  Very high triglyceride levels can cause inflammation of the pancreas (pancreatitis).  How is this diagnosed?  Your health care provider may diagnose dyslipidemia based on a routine blood test (fasting blood test). Because most people do not have symptoms of the condition, this blood testing (lipid profile) is done on adults age 20 and older and is repeated every 5 years. This test checks:  · Total cholesterol. This measures the total amount of cholesterol in your blood, including LDL cholesterol, HDL cholesterol, and triglycerides. A healthy number is below 200.  · LDL cholesterol. The target number for LDL cholesterol is different for each person, depending on individual risk factors. Ask your health care provider what your LDL cholesterol should be.  · HDL cholesterol. An HDL level of 60 or higher is best because it helps to protect against heart disease. A number below 40 for men or below 50 for women increases the risk for heart disease.  · Triglycerides. A healthy triglyceride number is below 150.  If your lipid profile is abnormal, your health care provider may do other blood tests.  How is this treated?  Treatment depends on the type of dyslipidemia that you have and your other risk factors for heart disease and stroke. Your health care provider will have a target range for your lipid levels based on this information.  For many people, this condition may be treated by lifestyle changes, such as diet and exercise. Your health care provider may recommend that you:  · Get regular exercise.  · Make changes to your diet.  · Quit smoking if you smoke.  If diet changes and exercise do not help you reach your goals, your health care provider may also prescribe medicine to lower lipids. The most commonly prescribed type of medicine  lowers your LDL cholesterol (statin drug). If you have a high triglyceride level, your provider may prescribe another type of drug (fibrate) or an omega-3 fish oil supplement, or both.  Follow these instructions at home:    Eating and drinking  · Follow instructions from your health care provider or dietitian about eating or drinking restrictions.  · Eat a healthy diet as told by your health care provider. This can help you reach and maintain a healthy weight, lower your LDL cholesterol, and raise your HDL cholesterol. This may include:  ? Limiting your calories, if you are overweight.  ? Eating more fruits, vegetables, whole grains, fish, and lean meats.  ? Limiting saturated fat, trans fat, and cholesterol.  · If you drink alcohol:  ? Limit how much you use.  ? Be aware of how much alcohol is in your drink. In the [place] one drink equals one 12 oz bottle of beer (355 mL), one 5 oz glass of wine (148 mL), or one 1½ oz glass of hard liquor (44 mL).  · Do not drink alcohol if:  ? Your health care provider tells you not to drink.  ? You are pregnant, may be pregnant, or are planning to become pregnant.  Activity  · Get regular exercise. Start an exercise and strength training program as told by your health care provider. Ask your health care provider what activities are safe for you. Your health care provider may recommend:  ? 30 minutes of aerobic activity 4-6 days a week. Brisk walking is an example of aerobic activity.  ? Strength training 2 days a week.  General instructions  · Do not use any products that contain nicotine or tobacco, such as cigarettes, e-cigarettes, and chewing tobacco. If you need help quitting, ask your health care provider.  · Take over-the-counter and prescription medicines only as told by your health care provider. This includes supplements.  · Keep all follow-up visits as told by your health care provider.  Contact a health care provider if:  · You are:  ? Having trouble sticking to your  exercise or diet plan.  ? Struggling to quit smoking or control your use of alcohol.  Summary  · Dyslipidemia often involves a high level of cholesterol or triglycerides, which are types of lipids.  · Treatment depends on the type of dyslipidemia that you have and your other risk factors for heart disease and stroke.  · For many people, treatment starts with lifestyle changes, such as diet and exercise.  · Your health care provider may prescribe medicine to lower lipids.  This information is not intended to replace advice given to you by your health care provider. Make sure you discuss any questions you have with your health care provider.  Document Released: 12/23/2014 Document Revised: 08/12/2019 Document Reviewed: 07/19/2019  [employer] Patient Education © 2020 [employer]      Rosuvastatin capsules  What is this medicine?  ROSUVASTATIN (darling MARS va sta tin) is known as an HMG-CoA reductase inhibitor or 'statin'. It lowers cholesterol and triglycerides in the blood. Diet and lifestyle changes are often used with this drug.  This medicine may be used for other purposes; ask your health care provider or pharmacist if you have questions.  COMMON BRAND NAME(S): Ezauryor  What should I tell my health care provider before I take this medicine?  They need to know if you have any of these conditions:  · diabetes  · if you often drink alcohol  · history of stroke  · kidney disease  · liver disease  · muscle aches or weakness  · thyroid disease  · an unusual or allergic reaction to rosuvastatin, other medicines, foods, dyes, or preservatives  · pregnant or trying to get pregnant  · breast-feeding  How should I use this medicine?  Take this medicine by mouth with a glass of water. Follow the directions on the prescription label. Swallow the capsules whole. Do not crush or chew this medicine. You may open the capsule and put the contents in 1 teaspoon of applesauce, chocolate pudding, or vanilla pudding. Swallow the medicine  with applesauce or pudding within 60 minutes (1 hour). Do not chew the medicine, applesauce, or pudding. You can take this medicine with or without food. Take your doses at regular intervals. Do not take your medicine more often than directed.  Talk to your pediatrician about the use of this medicine in children. Special care may be needed.  Overdosage: If you think you have taken too much of this medicine contact a poison control center or emergency room at once.  NOTE: This medicine is only for you. Do not share this medicine with others.  What if I miss a dose?  If you miss a dose, take it as soon as you can. If your next dose is to be taken in less than 12 hours, then do not take the missed dose. Take the next dose at your regular time. Do not take double or extra doses.  What may interact with this medicine?  Do not take this medicine with any of the following medications:  · herbal medicines like red yeast rice  This medicine may also interact with the following medications:  · alcohol  · antacids containing aluminum hydroxide or magnesium hydroxide  · cyclosporine  · other medicines for high cholesterol  · some medicines for HIV infection  · warfarin  This list may not describe all possible interactions. Give your health care provider a list of all the medicines, herbs, non-prescription drugs, or dietary supplements you use. Also tell them if you smoke, drink alcohol, or use illegal drugs. Some items may interact with your medicine.  What should I watch for while using this medicine?  Visit your doctor or health care professional for regular check-ups. You may need regular tests to make sure your liver is working properly.  Your health care professional may tell you to stop taking this medicine if you develop muscle problems. If your muscle problems do not go away after stopping this medicine, contact your health care professional.  Do not become pregnant while taking this medicine. Women should inform their  health care professional if they wish to become pregnant or think they might be pregnant. There is a potential for serious side effects to an unborn child. Talk to your health care professional or pharmacist for more information. Do not breast-feed an infant while taking this medicine.  This medicine may increase blood sugar. Ask your healthcare provider if changes in diet or medicines are needed if you have diabetes.  If you are going to need surgery or other procedure, tell your doctor that you are using this medicine.  This drug is only part of a total heart-health program. Your doctor or a dietician can suggest a low-cholesterol and low-fat diet to help. Avoid alcohol and smoking, and keep a proper exercise schedule.  This medicine may cause a decrease in Co-Enzyme Q-10. You should make sure that you get enough Co-Enzyme Q-10 while you are taking this medicine. Discuss the foods you eat and the vitamins you take with your health care professional.  What side effects may I notice from receiving this medicine?  Side effects that you should report to your doctor or health care professional as soon as possible:  · allergic reactions like skin rash, itching or hives, swelling of the face, lips, or tongue  · dark urine  · fever  · joint pain  · muscle cramps, pain  · redness, blistering, peeling or loosening of the skin, including inside the mouth  · signs and symptoms of high blood sugar such as being more thirsty or hungry or having to urinate more than normal. You may also feel very tired or have blurry vision.  · trouble passing urine or change in the amount of urine  · unusually weak  · yellowing of the eyes or skin  Side effects that usually do not require medical attention (report these to your doctor or health care professional if they continue or are bothersome):  · constipation  · heartburn  · nausea  · stomach gas, pain, upset  This list may not describe all possible side effects. Call your doctor for  medical advice about side effects. You may report side effects to FDA at 5-495-KDT-4319.  Where should I keep my medicine?  Keep out of the reach of children.  Store at room temperature between 20 and 25 degrees C (68 and 77 degrees F). Keep container tightly closed (protect from moisture). Throw away any unused medicine after the expiration date.  NOTE: This sheet is a summary. It may not cover all possible information. If you have questions about this medicine, talk to your doctor, pharmacist, or health care provider.  © 2020 [employer] (2020-04-21 10:34:28)  Metoprolol extended-release tablets  What is this medicine?  METOPROLOL (me TOE proe lole) is a beta-blocker. Beta-blockers reduce the workload on the heart and help it to beat more regularly. This medicine is used to treat high blood pressure and to prevent chest pain. It is also used to after a heart attack and to prevent an additional heart attack from occurring.  This medicine may be used for other purposes; ask your health care provider or pharmacist if you have questions.  COMMON BRAND NAME(S): toprol, Toprol XL  What should I tell my health care provider before I take this medicine?  They need to know if you have any of these conditions:  · diabetes  · heart or vessel disease like slow heart rate, worsening heart failure, heart block, sick sinus syndrome or Raynaud's disease  · kidney disease  · liver disease  · lung or breathing disease, like asthma or emphysema  · pheochromocytoma  · thyroid disease  · an unusual or allergic reaction to metoprolol, other beta-blockers, medicines, foods, dyes, or preservatives  · pregnant or trying to get pregnant  · breast-feeding  How should I use this medicine?  Take this medicine by mouth with a glass of water. Follow the directions on the prescription label. Do not crush or chew. Take this medicine with or immediately after meals. Take your doses at regular intervals. Do not take more medicine than  directed. Do not stop taking this medicine suddenly. This could lead to serious heart-related effects.  Talk to your pediatrician regarding the use of this medicine in children. While this drug may be prescribed for children as young as 6 years for selected conditions, precautions do apply.  Overdosage: If you think you have taken too much of this medicine contact a poison control center or emergency room at once.  NOTE: This medicine is only for you. Do not share this medicine with others.  What if I miss a dose?  If you miss a dose, take it as soon as you can. If it is almost time for your next dose, take only that dose. Do not take double or extra doses.  What may interact with this medicine?  This medicine may interact with the following medications:  · certain medicines for blood pressure, heart disease, irregular heart beat  · certain medicines for depression, like monoamine oxidase (MAO) inhibitors, fluoxetine, or paroxetine  · clonidine  · dobutamine  · epinephrine  · isoproterenol  · reserpine  This list may not describe all possible interactions. Give your health care provider a list of all the medicines, herbs, non-prescription drugs, or dietary supplements you use. Also tell them if you smoke, drink alcohol, or use illegal drugs. Some items may interact with your medicine.  What should I watch for while using this medicine?  Visit your doctor or health care professional for regular check ups. Contact your doctor right away if your symptoms worsen. Check your blood pressure and pulse rate regularly. Ask your health care professional what your blood pressure and pulse rate should be, and when you should contact them.  You may get drowsy or dizzy. Do not drive, use machinery, or do anything that needs mental alertness until you know how this medicine affects you. Do not sit or stand up quickly, especially if you are an older patient. This reduces the risk of dizzy or fainting spells. Contact your doctor if  these symptoms continue. Alcohol may interfere with the effect of this medicine. Avoid alcoholic drinks.  This medicine may increase blood sugar. Ask your healthcare provider if changes in diet or medicines are needed if you have diabetes.  What side effects may I notice from receiving this medicine?  Side effects that you should report to your doctor or health care professional as soon as possible:  · allergic reactions like skin rash, itching or hives  · cold or numb hands or feet  · depression  · difficulty breathing  · faint  · fever with sore throat  · irregular heartbeat, chest pain  · rapid weight gain  ·   signs and symptoms of high blood sugar such as being more thirsty or hungry or having to urinate more than normal. You may also feel very tired or have blurry vision.  · swollen legs or ankles  Side effects that usually do not require medical attention (report to your doctor or health care professional if they continue or are bothersome):  · anxiety or nervousness  · change in sex drive or performance  · dry skin  · headache  · nightmares or trouble sleeping  · short term memory loss  · stomach upset or diarrhea  This list may not describe all possible side effects. Call your doctor for medical advice about side effects. You may report side effects to FDA at 6-892-BEL-2129.  Where should I keep my medicine?  Keep out of the reach of children.  Store at room temperature between 15 and 30 degrees C (59 and 86 degrees F). Throw away any unused medicine after the expiration date.  NOTE: This sheet is a summary. It may not cover all possible information. If you have questions about this medicine, talk to your doctor, pharmacist, or health care provider.  © 2020 [employer] (2019-10-08 11:09:41)

## 2022-02-07 ENCOUNTER — OFFICE VISIT (OUTPATIENT)
Dept: CARDIOLOGY | Facility: MEDICAL CENTER | Age: 72
End: 2022-02-07
Attending: NURSE PRACTITIONER
Payer: COMMERCIAL

## 2022-02-07 VITALS
DIASTOLIC BLOOD PRESSURE: 78 MMHG | HEIGHT: 64 IN | RESPIRATION RATE: 14 BRPM | HEART RATE: 102 BPM | OXYGEN SATURATION: 95 % | SYSTOLIC BLOOD PRESSURE: 126 MMHG | BODY MASS INDEX: 28.85 KG/M2 | WEIGHT: 169 LBS

## 2022-02-07 DIAGNOSIS — Z98.890 S/P ABLATION OF ATRIAL FIBRILLATION: ICD-10-CM

## 2022-02-07 DIAGNOSIS — Z86.79 S/P ABLATION OF ATRIAL FIBRILLATION: ICD-10-CM

## 2022-02-07 DIAGNOSIS — Z79.01 ANTICOAGULATED: ICD-10-CM

## 2022-02-07 DIAGNOSIS — E78.5 DYSLIPIDEMIA: ICD-10-CM

## 2022-02-07 DIAGNOSIS — I48.0 PAROXYSMAL ATRIAL FIBRILLATION (HCC): Chronic | ICD-10-CM

## 2022-02-07 LAB — EKG IMPRESSION: NORMAL

## 2022-02-07 PROCEDURE — 93000 ELECTROCARDIOGRAM COMPLETE: CPT | Performed by: INTERNAL MEDICINE

## 2022-02-07 PROCEDURE — 99214 OFFICE O/P EST MOD 30 MIN: CPT | Performed by: INTERNAL MEDICINE

## 2022-02-07 ASSESSMENT — ENCOUNTER SYMPTOMS
DEPRESSION: 0
SPEECH CHANGE: 0
FEVER: 0
LOSS OF CONSCIOUSNESS: 0
WHEEZING: 0
EYE DISCHARGE: 0
BLURRED VISION: 0
PALPITATIONS: 0
HEMOPTYSIS: 0
MYALGIAS: 0
COUGH: 0
NAUSEA: 0
BRUISES/BLEEDS EASILY: 0
VOMITING: 0
NERVOUS/ANXIOUS: 0
CHILLS: 0
EYE PAIN: 0
ABDOMINAL PAIN: 0

## 2022-02-07 ASSESSMENT — FIBROSIS 4 INDEX: FIB4 SCORE: 1.37

## 2022-02-07 NOTE — PROGRESS NOTES
Chief Complaint   Patient presents with   • Atrial Fibrillation     F/V Dx: Paroxysmal atrial fibrillation (HCC)       Wayne Diego is a 71 y.o. female who presents today being seen in consult on request of JASPREET Foster.  Persistent atrial fibrillation previous catheter ablation 2013 Dr. Polanco early recurrence, multiple cardioversions. Asymptomatic.  Retired rancher.  Denies smoking or significant alcohol use.  No palpitations no chest pain or shortness of breath.  On Pradaxa which is quite expensive.    Past Medical History:   Diagnosis Date   • Atrial fibrillation (HCC)    • Breast cancer (HCC)    • Cancer (HCC) 1996    Bilat breast , chemo and reconstruction   • Cataract     bilateral   • Chickenpox    • Cold 11/28/2016    resolved    • History of breast cancer 7/29/2016    Bilateral--status post bilat mastectomy, chemo. 1996 Tamoxifen x 5 yr.    • Influenza    • Kidney stone    • Mumps    • Nasal drainage    • Pneumonia    • Smallpox    • Tonsillitis      Past Surgical History:   Procedure Laterality Date   • RECOVERY  2/15/2013    Performed by Cath-Recovery Surgery at SURGERY SAME DAY [facility]   • RECOVERY  12/18/2012    Performed by Cath-Recovery Surgery at SURGERY SAME DAY [facility]   • LUMPECTOMY      Breast cancer reconstruction   • OTHER      Bilat breast recon  gilbert flap   • OTHER      T&A   • OTHER ORTHOPEDIC SURGERY      Recon hand surg   • TONSILLECTOMY       Family History   Problem Relation Age of Onset   • Heart Disease Mother    • Stroke Mother    • Cancer Paternal Grandmother         breast   • Heart Failure Sister         atrial fibrillation and CHF   • Heart Disease Maternal Grandmother      Social History     Socioeconomic History   • Marital status: Single     Spouse name: Not on file   • Number of children: Not on file   • Years of education: Not on file   • Highest education level: Not on file   Occupational History   • Not on file   Tobacco Use   • Smoking status:  Former Smoker     Packs/day: 2.00     Years: 44.00     Pack years: 88.00     Types: Cigarettes     Quit date: 2012     Years since quittin.9   • Smokeless tobacco: Never Used   Substance and Sexual Activity   • Alcohol use: Yes     Comment: social    • Drug use: No   • Sexual activity: Not Currently     Partners: Male     Comment:    Other Topics Concern   • Not on file   Social History Narrative         Social Determinants of Health     Financial Resource Strain:    • Difficulty of Paying Living Expenses: Not on file   Food Insecurity:    • Worried About Running Out of Food in the Last Year: Not on file   • Ran Out of Food in the Last Year: Not on file   Transportation Needs:    • Lack of Transportation (Medical): Not on file   • Lack of Transportation (Non-Medical): Not on file   Physical Activity:    • Days of Exercise per Week: Not on file   • Minutes of Exercise per Session: Not on file   Stress:    • Feeling of Stress : Not on file   Social Connections:    • Frequency of Communication with Friends and Family: Not on file   • Frequency of Social Gatherings with Friends and Family: Not on file   • Attends Jainism Services: Not on file   • Active Member of Clubs or Organizations: Not on file   • Attends Club or Organization Meetings: Not on file   • Marital Status: Not on file   Intimate Partner Violence:    • Fear of Current or Ex-Partner: Not on file   • Emotionally Abused: Not on file   • Physically Abused: Not on file   • Sexually Abused: Not on file   Housing Stability:    • Unable to Pay for Housing in the Last Year: Not on file   • Number of Places Lived in the Last Year: Not on file   • Unstable Housing in the Last Year: Not on file     Allergies   Allergen Reactions   • Blackberry Flavor Anaphylaxis     anaphylaxis   • Blueberry Flavor Anaphylaxis     anaphylaxis   • Food Anaphylaxis     All Berries     • Raspberry Anaphylaxis     anaphylaxis   • Strawberry Anaphylaxis      anaphylaxis   • Iodine Swelling     Swelling- IV  Contrast, topical OK   • Vicodin [Hydrocodone-Acetaminophen] Vomiting     sick     Outpatient Encounter Medications as of 2/7/2022   Medication Sig Dispense Refill   • Non Formulary Request Take  by mouth every day. [place]- PO     • metoprolol SR (TOPROL XL) 25 MG TABLET SR 24 HR Take 1 Tablet by mouth every day. 90 Tablet 3   • dabigatran (PRADAXA) 150 MG Cap capsule Take 1 Capsule by mouth 2 times a day. 180 Capsule 3   • rosuvastatin (CRESTOR) 10 MG Tab Take 1 Tablet by mouth every evening. 30 Tablet 11   • Multiple Minerals-Vitamins (CALCIUM-MAGNESIUM-ZINC-D3 PO) Take  by mouth.     • vitamin E (VITAMIN E) 1000 Unit (450 mg) Cap Take 1,000 Units by mouth every day.     • B Complex Vitamins (B COMPLEX 50 PO) Take 50 mg by mouth every day.     • NON SPECIFIED avila     • Safety Seal Miscellaneous Misc Inflamazol 1 capsule once daily     • Calcium Citrate-Vitamin D (CALCIUM + D PO) Take 1,200 mg by mouth.     • vitamin D (CHOLECALCIFEROL) 1000 UNIT Tab Take 2,000 Units by mouth every day.     • Probiotic Product (PROBIOTIC PO) Take 1 Tab by mouth every day.         No facility-administered encounter medications on file as of 2/7/2022.     Review of Systems   Constitutional: Negative for chills and fever.   HENT: Negative for congestion.    Eyes: Negative for blurred vision, pain and discharge.   Respiratory: Negative for cough, hemoptysis and wheezing.    Cardiovascular: Negative for chest pain and palpitations.   Gastrointestinal: Negative for abdominal pain, nausea and vomiting.   Musculoskeletal: Negative for joint pain and myalgias.   Skin: Negative for itching and rash.   Neurological: Negative for speech change and loss of consciousness.   Endo/Heme/Allergies: Does not bruise/bleed easily.   Psychiatric/Behavioral: Negative for depression. The patient is not nervous/anxious.    All other systems reviewed and are negative.             Objective 1.  Persistent  "atrial fibrillation    /78 (BP Location: Left arm, Patient Position: Sitting, BP Cuff Size: Adult)   Pulse (!) 102   Resp 14   Ht 1.626 m (5' 4\")   Wt 76.7 kg (169 lb)   SpO2 95%   BMI 29.01 kg/m²     Physical Exam  Constitutional:       Appearance: She is well-developed.   HENT:      Head: Normocephalic and atraumatic.   Eyes:      Pupils: Pupils are equal, round, and reactive to light.   Cardiovascular:      Rate and Rhythm: Normal rate. Rhythm irregular.      Heart sounds: Normal heart sounds. No murmur heard.  No friction rub. No gallop.    Pulmonary:      Effort: Pulmonary effort is normal.      Breath sounds: Normal breath sounds.   Abdominal:      General: Bowel sounds are normal.      Palpations: Abdomen is soft.   Musculoskeletal:         General: Normal range of motion.      Cervical back: Normal range of motion and neck supple.   Skin:     General: Skin is warm.   Neurological:      Mental Status: She is alert and oriented to person, place, and time.      Cranial Nerves: No cranial nerve deficit.   Psychiatric:         Behavior: Behavior normal.         Thought Content: Thought content normal.         Judgment: Judgment normal.                Assessment & Plan     1. Paroxysmal atrial fibrillation (HCC)  EKG   2. Dyslipidemia     3. S/P ablation of atrial fibrillation     4. Anticoagulated         Medical Decision Making: Today's Assessment/Status/Plan:   1.  Persistent atrial fibrillation.  Continue rate control asymptomatic.    2.  Hyperlipidemia on statins.  3.  Anticoagulation continue Pradaxa.  She will check with pharmacist to see if Eliquis or Xarelto is covered.  4.  Follow-up with me in 6 months.                   "

## 2022-02-17 ENCOUNTER — HOSPITAL ENCOUNTER (OUTPATIENT)
Dept: CARDIOLOGY | Facility: MEDICAL CENTER | Age: 72
End: 2022-02-17
Attending: NURSE PRACTITIONER
Payer: COMMERCIAL

## 2022-02-17 DIAGNOSIS — I48.0 PAROXYSMAL ATRIAL FIBRILLATION (HCC): ICD-10-CM

## 2022-02-17 LAB
LV EJECT FRACT  99904: 65
LV EJECT FRACT MOD 2C 99903: 60.36
LV EJECT FRACT MOD 4C 99902: 70.94
LV EJECT FRACT MOD BP 99901: 65.54

## 2022-02-17 PROCEDURE — 93306 TTE W/DOPPLER COMPLETE: CPT

## 2022-02-17 PROCEDURE — 93306 TTE W/DOPPLER COMPLETE: CPT | Mod: 26 | Performed by: INTERNAL MEDICINE

## 2022-02-23 ENCOUNTER — OFFICE VISIT (OUTPATIENT)
Dept: DERMATOLOGY | Facility: IMAGING CENTER | Age: 72
End: 2022-02-23
Payer: COMMERCIAL

## 2022-02-23 DIAGNOSIS — L57.0 ACTINIC KERATOSIS: ICD-10-CM

## 2022-02-23 PROCEDURE — 99212 OFFICE O/P EST SF 10 MIN: CPT | Performed by: NURSE PRACTITIONER

## 2022-02-23 NOTE — PROGRESS NOTES
DERMATOLOGY NOTE  FOLLOW UP VISIT       Chief complaint: Follow-Up and Actinic Keratosis   spot check on left malar area     History of skin cancer: no   History of precancers/actinic keratoses: Yes, Details: neck 2004   History of biopsies:Yes, Details: AK on neck 2004  History of blistering/severe sunburns:No  Family history of skin cancer:No  Family history of atypical moles:No      Allergies   Allergen Reactions   • Blackberry Flavor Anaphylaxis     anaphylaxis   • Blueberry Flavor Anaphylaxis     anaphylaxis   • Food Anaphylaxis     All Berries     • Raspberry Anaphylaxis     anaphylaxis   • Strawberry Anaphylaxis     anaphylaxis   • Iodine Swelling     Swelling- IV  Contrast, topical OK   • Vicodin [Hydrocodone-Acetaminophen] Vomiting     sick        MEDICATIONS:  Medications relevant to specialty reviewed.     REVIEW OF SYSTEMS:   Positive for skin (see HPI)  Negative for fevers and chills       EXAM:  There were no vitals taken for this visit.  Constitutional: Well-developed, well-nourished, and in no distress.     A focused skin exam was performed including the affected areas of the face. Notable findings on exam today listed below and/or in assessment/plan.     No evidence of remaining AK previously treated on L zygomatic bone region other that mild residual erythema  No evidence of AK to L temple, upper forehead in hairline as suggested by  who did facial yesterday    IMPRESSION / PLAN:    1. Actinic keratosis  Discussed course/nature of disease  Stressed importance of sun protection and attending regularly scheduled TSEs          I have performed a physical exam and reviewed and updated ROS and Plan today (2/23/2022). In review of dermatology visit (12/21/2022), there are no changes except as documented above.          Please note that this dictation was created using voice recognition software. I have made every reasonable attempt to correct obvious errors, but I expect that there are errors  of grammar and possibly content that I did not discover before finalizing the note.      Return to clinic in: Return for as needed. and as needed for any new or changing skin lesions.

## 2022-03-03 ENCOUNTER — PATIENT MESSAGE (OUTPATIENT)
Dept: CARDIOLOGY | Facility: MEDICAL CENTER | Age: 72
End: 2022-03-03
Payer: COMMERCIAL

## 2022-03-25 ENCOUNTER — OFFICE VISIT (OUTPATIENT)
Dept: INTERNAL MEDICINE | Facility: IMAGING CENTER | Age: 72
End: 2022-03-25
Payer: COMMERCIAL

## 2022-03-25 VITALS
DIASTOLIC BLOOD PRESSURE: 75 MMHG | HEART RATE: 89 BPM | RESPIRATION RATE: 14 BRPM | BODY MASS INDEX: 28.99 KG/M2 | HEIGHT: 64 IN | TEMPERATURE: 98 F | SYSTOLIC BLOOD PRESSURE: 124 MMHG | OXYGEN SATURATION: 94 %

## 2022-03-25 DIAGNOSIS — I48.11 LONGSTANDING PERSISTENT ATRIAL FIBRILLATION (HCC): ICD-10-CM

## 2022-03-25 DIAGNOSIS — D68.69 HYPERCOAGULABLE STATE DUE TO ATRIAL FIBRILLATION (HCC): ICD-10-CM

## 2022-03-25 DIAGNOSIS — M77.8 RIGHT SHOULDER TENDINITIS: ICD-10-CM

## 2022-03-25 DIAGNOSIS — I48.91 HYPERCOAGULABLE STATE DUE TO ATRIAL FIBRILLATION (HCC): ICD-10-CM

## 2022-03-25 PROCEDURE — 99214 OFFICE O/P EST MOD 30 MIN: CPT | Performed by: FAMILY MEDICINE

## 2022-03-25 RX ORDER — METHYLPREDNISOLONE 4 MG/1
TABLET ORAL
Qty: 21 TABLET | Refills: 0 | Status: SHIPPED | OUTPATIENT
Start: 2022-03-25 | End: 2022-11-02

## 2022-03-25 ASSESSMENT — PATIENT HEALTH QUESTIONNAIRE - PHQ9: CLINICAL INTERPRETATION OF PHQ2 SCORE: 0

## 2022-04-03 PROBLEM — I48.91 HYPERCOAGULABLE STATE DUE TO ATRIAL FIBRILLATION (HCC): Status: ACTIVE | Noted: 2022-04-03

## 2022-04-03 PROBLEM — D68.69 HYPERCOAGULABLE STATE DUE TO ATRIAL FIBRILLATION (HCC): Status: ACTIVE | Noted: 2022-04-03

## 2022-04-03 NOTE — PROGRESS NOTES
"Chief Complaint   Patient presents with   • Shoulder Injury     Right         Subjective:     HPI:   Michelle Diego is a 71 y.o. female with history of persistent A. fib on anticoagulation here to discuss the evaluation and management of:    3-week history of worsening, chronic right shoulder pain which seems to be worse at night  Pain can be 8 out of 10 with movement    No acute trauma  No recent imaging      Problem   Hypercoagulable State Due to Atrial Fibrillation (Hcc)   Longstanding Persistent Atrial Fibrillation (Hcc)    Has  Establish with a cardiologist    Stable asymptomatic          Objective:     /75   Pulse 89   Temp 36.7 °C (98 °F) (Temporal)   Resp 14   Ht 1.626 m (5' 4.02\")   SpO2 94%  Body mass index is 28.99 kg/m².    Physical Exam:  Physical Exam  Constitutional: Well-developed and well-nourished. Not diaphoretic. No distress.   Skin: Skin is warm and dry. No rash noted.  Head: Atraumatic without lesions.  Eyes: Conjunctivae and extraocular motions are normal.   Ears:  External ears unremarkable.    Nose: Nares patent. Mucosa without edema or erythema. No discharge. No facial tenderness.     Neck: Supple,    Cardiovascular: Regular rate and rhyt   Abdomen:  without distention.  .  Extremities: No cyanosis, clubbing, erythema, nor edema.  Shoulders with full range of motion  Neurological: Alert and oriented x 3.    Psychiatric:  Behavior, mood, and affect are appropriate     Assessment and Plan:     The following treatment plan was discussed:  1. Right shoulder tendinitis  Patient would like a trial of a Medrol Dosepak prior to imaging    2. Longstanding persistent atrial fibrillation (HCC)-stable, status post ablation, continue follow-up with cardiology    3. Hypercoagulable state due to atrial fibrillation (HCC)-continue with Pradaxa    Other orders  - methylPREDNISolone (MEDROL DOSEPAK) 4 MG Tablet Therapy Pack; As directed on the packaging label.  Dispense: 21 Tablet; Refill: 0       " Longstanding persistent atrial fibrillation (HCC)  Rate controlled ,continue with anticoagulation           Any change or worsening of signs or symptoms, patient encouraged to follow-up or report to emergency room for further evaluation. Patient verbalizes understanding and agrees.    Follow-Up: No follow-ups on file.      PLEASE NOTE: This dictation was created using voice recognition software. I have made every reasonable attempt to correct obvious errors, but I expect that there are errors of grammar and possibly content that I did not discover before finalizing the note.    My total time spent caring for the patient on the day of the encounter was  greater than 30 minutes.   This includes obtaining history, reviewing chart, physical exam, patient education, reviewing outside records, placing orders, interpreting tests and coordinating care.

## 2022-05-05 DIAGNOSIS — M77.8 RIGHT SHOULDER TENDINITIS: ICD-10-CM

## 2022-05-05 RX ORDER — METHYLPREDNISOLONE 4 MG/1
TABLET ORAL
Qty: 21 TABLET | Refills: 0 | Status: SHIPPED | OUTPATIENT
Start: 2022-05-05 | End: 2022-11-02

## 2022-05-06 DIAGNOSIS — M77.8 RIGHT SHOULDER TENDINITIS: ICD-10-CM

## 2022-11-02 ENCOUNTER — TELEPHONE (OUTPATIENT)
Dept: CARDIOLOGY | Facility: MEDICAL CENTER | Age: 72
End: 2022-11-02

## 2022-11-02 ENCOUNTER — TELEMEDICINE (OUTPATIENT)
Dept: CARDIOLOGY | Facility: MEDICAL CENTER | Age: 72
End: 2022-11-02
Payer: COMMERCIAL

## 2022-11-02 VITALS
HEIGHT: 64 IN | WEIGHT: 147 LBS | BODY MASS INDEX: 25.1 KG/M2 | DIASTOLIC BLOOD PRESSURE: 75 MMHG | SYSTOLIC BLOOD PRESSURE: 124 MMHG | HEART RATE: 89 BPM

## 2022-11-02 DIAGNOSIS — I48.0 PAROXYSMAL ATRIAL FIBRILLATION (HCC): ICD-10-CM

## 2022-11-02 DIAGNOSIS — D68.69 HYPERCOAGULABLE STATE DUE TO ATRIAL FIBRILLATION (HCC): ICD-10-CM

## 2022-11-02 DIAGNOSIS — I48.11 LONGSTANDING PERSISTENT ATRIAL FIBRILLATION (HCC): ICD-10-CM

## 2022-11-02 DIAGNOSIS — I48.91 HYPERCOAGULABLE STATE DUE TO ATRIAL FIBRILLATION (HCC): ICD-10-CM

## 2022-11-02 DIAGNOSIS — E78.5 DYSLIPIDEMIA: ICD-10-CM

## 2022-11-02 PROCEDURE — 99214 OFFICE O/P EST MOD 30 MIN: CPT | Mod: 95 | Performed by: NURSE PRACTITIONER

## 2022-11-02 RX ORDER — DABIGATRAN ETEXILATE 150 MG/1
150 CAPSULE ORAL 2 TIMES DAILY
Qty: 180 CAPSULE | Refills: 3 | Status: SHIPPED | OUTPATIENT
Start: 2022-11-02 | End: 2024-01-02 | Stop reason: SDUPTHER

## 2022-11-02 RX ORDER — METOPROLOL SUCCINATE 25 MG/1
25 TABLET, EXTENDED RELEASE ORAL DAILY
Qty: 90 TABLET | Refills: 3 | Status: SHIPPED | OUTPATIENT
Start: 2022-11-02 | End: 2023-12-19 | Stop reason: SDUPTHER

## 2022-11-02 ASSESSMENT — FIBROSIS 4 INDEX: FIB4 SCORE: 1.37

## 2022-11-02 NOTE — PROGRESS NOTES
Virtual Visit: Established Patient   This visit was conducted via Zoom using secure and encrypted videoconferencing technology.   The patient was in their home in the [facility].    The patient's identity was confirmed and verbal consent was obtained for this virtual visit.     Subjective:   CC:   Chief Complaint   Patient presents with    Atrial Fibrillation     Virtual follow up        Michelle Diego is a 71 y.o. female presenting for evaluation and management of: Afib    Patient HPI:   Michelle Diego is a 71 y.o. female with current medical problems including paroxysmal atrial fibrillation, dyslipidemia, last saw Dr. Brady in 2016.    Interim events     Followed up with Dr. Omalley in 2/2022 for persistent Afib, rate control strategy recommended, no further ablations. She remains on Pradaxa.     She denies palpitations, chest pain, shortness of breath, dyspnea on exertion, dizziness or syncopal episodes, orthopnea, PND, lower extremity swelling, and recent weight gain.   She can get SOB when pushing trash cart up hill to curb.     Per my last office visit note on 1/6/22  Review of last note she had cardioversion in 2016 went back into A. fib, had been on flecainide for rhythm control in the past.  She has remained on appropriate anticoagulation with Pradaxa.     She also had ablation by Dr. Polanco in @ 2012, has had many cardioversions.  Ablation lasted 5 days.  Cardioversion only lasted a few hours she was back in atrial fibrillation     She denies chest pain, shortness of breath, dyspnea on exertion, dizziness or syncopal episodes, orthopnea, PND, lower extremity swelling, and recent weight gain.      She feels like she is in Afib 95%, on a rare occasion she does feel she has a normal rate     Bp at home is 120/70's, feels it is elevated today in office due to the anxiety of coming back to see the cardiologist after 6 years.     Patient endorses medication compliance.  She has been able to remain on Pradaxa.  Not  on any medication for rate control at this time     Cardiovascular Risk Factors:  1. Smoking status: former, 88 pk yr Hx  2. Type II Diabetes Mellitus: prediabetic         Lab Results   Component Value Date/Time     HBA1C 5.9 (H) 09/21/2021 08:30 AM     HBA1C 5.3 11/06/2020 09:30 AM      3. Hypertension: No  4. Dyslipidemia: Yes, no medication        Cholesterol,Tot   Date Value Ref Range Status   09/21/2021 202 (H) 100 - 199 mg/dL Final            LDL   Date Value Ref Range Status   09/21/2021 114 (H) <100 mg/dL Final            HDL   Date Value Ref Range Status   09/21/2021 51 >=40 mg/dL Final            Triglycerides   Date Value Ref Range Status   09/21/2021 185 (H) 0 - 149 mg/dL Final      5. Family history of early Coronary Artery Disease in a first degree relative (Male less than 55 years of age; Female less than 65 years of age): No  6.  Obesity and/or Metabolic Syndrome: no  7. Sedentary lifestyle: no    ROS   Other     Current medicines (including changes today)  Current Outpatient Medications   Medication Sig Dispense Refill    metoprolol SR (TOPROL XL) 25 MG TABLET SR 24 HR Take 1 Tablet by mouth every day. 90 Tablet 3    dabigatran (PRADAXA) 150 MG Cap capsule Take 1 Capsule by mouth 2 times a day. 180 Capsule 3    rosuvastatin (CRESTOR) 10 MG Tab TAKE 1 TABLET BY MOUTH EVERY DAY IN THE EVENING 90 Tablet 1    Non Formulary Request Take  by mouth every day. [place]- PO      Multiple Minerals-Vitamins (CALCIUM-MAGNESIUM-ZINC-D3 PO) Take  by mouth.      vitamin E (VITAMIN E) 1000 Unit (450 mg) Cap Take 1,000 Units by mouth every day.      B Complex Vitamins (B COMPLEX 50 PO) Take 50 mg by mouth every day.      Safety Seal Miscellaneous Misc Inflamazol 1 capsule once daily      Calcium Citrate-Vitamin D (CALCIUM + D PO) Take 1,200 mg by mouth.      vitamin D (CHOLECALCIFEROL) 1000 UNIT Tab Take 2,000 Units by mouth every day.      Probiotic Product (PROBIOTIC PO) Take 1 Tab by mouth every day.         No  "current facility-administered medications for this visit.       Patient Active Problem List    Diagnosis Date Noted    Hypercoagulable state due to atrial fibrillation (HCC) 04/03/2022    S/P ablation of atrial fibrillation 02/07/2022    Anticoagulated 02/07/2022    Abnormal urinalysis 11/13/2020    Incontinence in female 11/12/2020    Right leg pain 11/12/2020    Nocturnal hypoxemia 03/07/2017    Former smoker 03/07/2017    History of breast cancer 07/29/2016    Dyslipidemia 10/31/2012    Longstanding persistent atrial fibrillation (HCC)     Nephrolithiasis 10/29/2012        Objective:   /75 (BP Location: Left arm, Patient Position: Sitting)   Pulse 89   Ht 1.626 m (5' 4\")   Wt 66.7 kg (147 lb) Comment: pt stated  BMI 25.23 kg/m²     Echo 2/17/22  CONCLUSIONS  Compared to the prior echo on 11/22/16, no changes.  The left ventricular ejection fraction is visually estimated to be 65%.  Normal right ventricular size and systolic function.  Mild aortic insufficiency.     Physical Exam:  Constitutional: Alert, no distress, well-groomed.  Skin: No rashes in visible areas.  Eye: Round. Conjunctiva clear, lids normal. No icterus.   ENMT: Lips pink without lesions, good dentition, moist mucous membranes. Phonation normal.  Neck: No masses, no thyromegaly. Moves freely without pain.  Respiratory: Unlabored respiratory effort, no cough or audible wheeze  Psych: Alert and oriented x3, normal affect and mood.     Assessment and Plan:   The following treatment plan was discussed:     1. Paroxysmal atrial fibrillation (HCC)  - metoprolol SR (TOPROL XL) 25 MG TABLET SR 24 HR; Take 1 Tablet by mouth every day.  Dispense: 90 Tablet; Refill: 3  - dabigatran (PRADAXA) 150 MG Cap capsule; Take 1 Capsule by mouth 2 times a day.  Dispense: 180 Capsule; Refill: 3    2. Dyslipidemia  - Lipid Profile; Future    3. Hypercoagulable state due to atrial fibrillation (HCC)    4. Longstanding persistent atrial fibrillation " (HCC)    Follow-up: No follow-ups on file.         Atrial fibrillation  Hypercoagulable state d/t Afib  -Type: Chronic, in Afib rate 's   -Rate control: Continue metoprolol SR 25 mg daily  -Symptoms: She feels she tolerates atrial fibrillation quite well after all these years  -[id number] Score: 2   -Risk Factors: advanced age and family hx (all her sisters have atrial fibrillation)  -Further Testing Recommended: Echo completed, normal  -Anticoagulation: Pradaxa 150 mg twice daily, Xarelto and Elquis just as expensive per patient  CONCLUSIONS  Compared to the prior echo on 11/22/16, no changes.  The left ventricular ejection fraction is visually estimated to be 65%.  Normal right ventricular size and systolic function.  Mild aortic insufficiency.        Dyslipidemia  -The 10-year ASCVD risk score (Eduardo DC Jr., et al., 2013) is: 10.6%  -Continue rosuvastatin 10mg daily   -Check lipid panel      Continue to F/U with Dr. Omalley. No need to also F/U with general cardiology.      MATTHIAS Mackey   [facility]  [phone]    Collaborating MD: Dr. Aceves

## 2022-11-03 NOTE — TELEPHONE ENCOUNTER
Is the patient due for a refill? Yes    Was the patient seen the past year? Yes    Date of last office visit: 11/2/22    Does the patient have an upcoming appointment?  Yes   If yes, When? 12/2/22    Provider to refill:mr    Does the patients insurance require a 100 day supply?  No

## 2022-11-04 RX ORDER — DABIGATRAN ETEXILATE 150 MG/1
CAPSULE ORAL
Qty: 180 CAPSULE | Refills: 3 | OUTPATIENT
Start: 2022-11-04

## 2022-11-05 NOTE — TELEPHONE ENCOUNTER
To HC, MA please follow up on PA for pt dabigatran, 150 mg cap, thank you!    =================    Reviewed prepped RX, see below.        Medication refused as PA is pending.

## 2022-11-14 ENCOUNTER — NON-PROVIDER VISIT (OUTPATIENT)
Dept: INTERNAL MEDICINE | Facility: IMAGING CENTER | Age: 72
End: 2022-11-14
Payer: COMMERCIAL

## 2022-11-14 DIAGNOSIS — Z23 NEED FOR VACCINATION: ICD-10-CM

## 2022-11-14 PROCEDURE — 90662 IIV NO PRSV INCREASED AG IM: CPT | Performed by: FAMILY MEDICINE

## 2022-11-14 PROCEDURE — 90471 IMMUNIZATION ADMIN: CPT | Performed by: FAMILY MEDICINE

## 2022-12-02 ENCOUNTER — OFFICE VISIT (OUTPATIENT)
Dept: CARDIOLOGY | Facility: MEDICAL CENTER | Age: 72
End: 2022-12-02
Payer: COMMERCIAL

## 2022-12-02 VITALS
BODY MASS INDEX: 28.34 KG/M2 | OXYGEN SATURATION: 95 % | HEIGHT: 64 IN | WEIGHT: 166 LBS | DIASTOLIC BLOOD PRESSURE: 68 MMHG | HEART RATE: 103 BPM | SYSTOLIC BLOOD PRESSURE: 142 MMHG | RESPIRATION RATE: 16 BRPM

## 2022-12-02 DIAGNOSIS — Z79.01 ANTICOAGULATED: ICD-10-CM

## 2022-12-02 DIAGNOSIS — Z98.890 S/P ABLATION OF ATRIAL FIBRILLATION: ICD-10-CM

## 2022-12-02 DIAGNOSIS — D68.69 HYPERCOAGULABLE STATE DUE TO ATRIAL FIBRILLATION (HCC): ICD-10-CM

## 2022-12-02 DIAGNOSIS — I48.0 PAROXYSMAL ATRIAL FIBRILLATION (HCC): ICD-10-CM

## 2022-12-02 DIAGNOSIS — Z86.79 S/P ABLATION OF ATRIAL FIBRILLATION: ICD-10-CM

## 2022-12-02 DIAGNOSIS — I48.91 HYPERCOAGULABLE STATE DUE TO ATRIAL FIBRILLATION (HCC): ICD-10-CM

## 2022-12-02 DIAGNOSIS — I48.11 LONGSTANDING PERSISTENT ATRIAL FIBRILLATION (HCC): ICD-10-CM

## 2022-12-02 DIAGNOSIS — E78.5 DYSLIPIDEMIA: ICD-10-CM

## 2022-12-02 LAB — EKG IMPRESSION: NORMAL

## 2022-12-02 PROCEDURE — 99214 OFFICE O/P EST MOD 30 MIN: CPT | Performed by: INTERNAL MEDICINE

## 2022-12-02 PROCEDURE — 93000 ELECTROCARDIOGRAM COMPLETE: CPT | Performed by: INTERNAL MEDICINE

## 2022-12-02 RX ORDER — ROSUVASTATIN CALCIUM 10 MG/1
10 TABLET, COATED ORAL EVERY EVENING
Qty: 90 TABLET | Refills: 3 | Status: SHIPPED | OUTPATIENT
Start: 2022-12-02 | End: 2024-01-02 | Stop reason: SDUPTHER

## 2022-12-02 ASSESSMENT — FIBROSIS 4 INDEX: FIB4 SCORE: 1.39

## 2022-12-02 NOTE — PROGRESS NOTES
Chief Complaint   Patient presents with    Atrial Fibrillation     Paroxysmal atrial fibrillation (HCC)       Subjective     Michelle Diego is a 72 y.o. female who presents today with history of persistent atrial fibrillation on rate control.  Asymptomatic.  On Pradaxa.  Recent normal echo.  On statins.  No complaints.Old RFA with Bidart. Multiple cardioversions.    Past Medical History:   Diagnosis Date    Atrial fibrillation (HCC)     Breast cancer (HCC)     Cancer (HCC) 1996    Bilat breast , chemo and reconstruction    Cataract     bilateral    Chickenpox     Cold 11/28/2016    resolved     History of breast cancer 7/29/2016    Bilateral--status post bilat mastectomy, chemo. 1996 Tamoxifen x 5 yr.     Influenza     Kidney stone     Mumps     Nasal drainage     Pneumonia     Smallpox     Tonsillitis      Past Surgical History:   Procedure Laterality Date    RECOVERY  2/15/2013    Performed by Cath-Recovery Surgery at SURGERY [facility]    RECOVERY  12/18/2012    Performed by Cath-Recovery Surgery at SURGERY SAME DAY [facility]S    LUMPECTOMY      Breast cancer reconstruction    OTHER      Bilat breast recon  gilbert flap    OTHER      T&A    OTHER ORTHOPEDIC SURGERY      Recon hand surg    TONSILLECTOMY       Family History   Problem Relation Age of Onset    Heart Disease Mother     Stroke Mother     Cancer Paternal Grandmother         breast    Heart Failure Sister         atrial fibrillation and CHF    Heart Disease Maternal Grandmother      Social History     Socioeconomic History    Marital status: Single     Spouse name: Not on file    Number of children: Not on file    Years of education: Not on file    Highest education level: Not on file   Occupational History    Not on file   Tobacco Use    Smoking status: Former     Packs/day: 2.00     Years: 44.00     Pack years: 88.00     Types: Cigarettes     Quit date: 2/14/2012     Years since quitting: 10.8    Smokeless tobacco: Never   Substance and Sexual  Activity    Alcohol use: Yes     Comment: social     Drug use: No    Sexual activity: Not Currently     Partners: Male     Comment:    Other Topics Concern    Not on file   Social History Narrative         Social Determinants of Health     Financial Resource Strain: Not on file   Food Insecurity: Not on file   Transportation Needs: Not on file   Physical Activity: Not on file   Stress: Not on file   Social Connections: Not on file   Intimate Partner Violence: Not on file   Housing Stability: Not on file     Allergies   Allergen Reactions    Blackberry Flavor Anaphylaxis     anaphylaxis    Blueberry Flavor Anaphylaxis     anaphylaxis    Food Anaphylaxis     All Berries      Raspberry Anaphylaxis     anaphylaxis    Strawberry Anaphylaxis     anaphylaxis    Iodine Swelling     Swelling- IV  Contrast, topical OK    Vicodin [Hydrocodone-Acetaminophen] Vomiting     sick     Outpatient Encounter Medications as of 12/2/2022   Medication Sig Dispense Refill    rosuvastatin (CRESTOR) 10 MG Tab Take 1 Tablet by mouth every evening. 90 Tablet 3    metoprolol SR (TOPROL XL) 25 MG TABLET SR 24 HR Take 1 Tablet by mouth every day. 90 Tablet 3    dabigatran (PRADAXA) 150 MG Cap capsule Take 1 Capsule by mouth 2 times a day. 180 Capsule 3    Non Formulary Request Take  by mouth every day. [place]- PO      Multiple Minerals-Vitamins (CALCIUM-MAGNESIUM-ZINC-D3 PO) Take  by mouth.      vitamin E (VITAMIN E) 1000 Unit (450 mg) Cap Take 1,000 Units by mouth every day.      B Complex Vitamins (B COMPLEX 50 PO) Take 50 mg by mouth every day.      Safety Seal Miscellaneous Misc Inflamazol 1 capsule once daily      Calcium Citrate-Vitamin D (CALCIUM + D PO) Take 1,200 mg by mouth.      vitamin D (CHOLECALCIFEROL) 1000 UNIT Tab Take 2,000 Units by mouth every day.      Probiotic Product (PROBIOTIC PO) Take 1 Tab by mouth every day.        [DISCONTINUED] rosuvastatin (CRESTOR) 10 MG Tab TAKE 1 TABLET BY MOUTH EVERY DAY IN THE  "EVENING 90 Tablet 1     No facility-administered encounter medications on file as of 12/2/2022.     ROS           Objective     BP (!) 142/68 (BP Location: Left arm, Patient Position: Sitting, BP Cuff Size: Adult)   Pulse (!) 103   Resp 16   Ht 1.626 m (5' 4\")   Wt 75.3 kg (166 lb)   SpO2 95%   BMI 28.49 kg/m²     Physical Exam  Constitutional:       Appearance: She is well-developed.   HENT:      Head: Normocephalic and atraumatic.   Eyes:      Pupils: Pupils are equal, round, and reactive to light.   Cardiovascular:      Rate and Rhythm: Normal rate. Rhythm irregular.      Heart sounds: Normal heart sounds. No murmur heard.    No friction rub. No gallop.   Pulmonary:      Effort: Pulmonary effort is normal.      Breath sounds: Normal breath sounds.   Abdominal:      General: Bowel sounds are normal.      Palpations: Abdomen is soft.   Musculoskeletal:         General: Normal range of motion.      Cervical back: Normal range of motion and neck supple.   Skin:     General: Skin is warm.   Neurological:      Mental Status: She is alert and oriented to person, place, and time.      Cranial Nerves: No cranial nerve deficit.   Psychiatric:         Behavior: Behavior normal.         Thought Content: Thought content normal.         Judgment: Judgment normal.              Assessment & Plan     1. Paroxysmal atrial fibrillation (HCC)  EKG      2. S/P ablation of atrial fibrillation        3. Longstanding persistent atrial fibrillation (HCC)        4. Hypercoagulable state due to atrial fibrillation (HCC)        5. Anticoagulated        6. Dyslipidemia  rosuvastatin (CRESTOR) 10 MG Tab    Comp Metabolic Panel    Lipid Profile          Medical Decision Making: Today's Assessment/Status/Plan:   1.  Atrial fibrillation persistent continue rate control.  2.  Anticoagulation continue Pradaxa.  3.  Hyperlipidemia continue statins, check labs.  4.  Follow-up in 6 to 12 months.                     "

## 2023-01-13 ENCOUNTER — HOSPITAL ENCOUNTER (OUTPATIENT)
Dept: LAB | Facility: MEDICAL CENTER | Age: 73
End: 2023-01-13
Attending: INTERNAL MEDICINE
Payer: COMMERCIAL

## 2023-01-13 DIAGNOSIS — E78.5 DYSLIPIDEMIA: ICD-10-CM

## 2023-01-13 LAB
ALBUMIN SERPL BCP-MCNC: 4.3 G/DL (ref 3.2–4.9)
ALBUMIN/GLOB SERPL: 1.4 G/DL
ALP SERPL-CCNC: 63 U/L (ref 30–99)
ALT SERPL-CCNC: 30 U/L (ref 2–50)
ANION GAP SERPL CALC-SCNC: 10 MMOL/L (ref 7–16)
AST SERPL-CCNC: 28 U/L (ref 12–45)
BILIRUB SERPL-MCNC: 1.2 MG/DL (ref 0.1–1.5)
BUN SERPL-MCNC: 15 MG/DL (ref 8–22)
CALCIUM ALBUM COR SERPL-MCNC: 9.2 MG/DL (ref 8.5–10.5)
CALCIUM SERPL-MCNC: 9.4 MG/DL (ref 8.5–10.5)
CHLORIDE SERPL-SCNC: 99 MMOL/L (ref 96–112)
CHOLEST SERPL-MCNC: 134 MG/DL (ref 100–199)
CO2 SERPL-SCNC: 28 MMOL/L (ref 20–33)
CREAT SERPL-MCNC: 0.84 MG/DL (ref 0.5–1.4)
FASTING STATUS PATIENT QL REPORTED: NORMAL
GFR SERPLBLD CREATININE-BSD FMLA CKD-EPI: 73 ML/MIN/1.73 M 2
GLOBULIN SER CALC-MCNC: 3.1 G/DL (ref 1.9–3.5)
GLUCOSE SERPL-MCNC: 108 MG/DL (ref 65–99)
HDLC SERPL-MCNC: 57 MG/DL
LDLC SERPL CALC-MCNC: 59 MG/DL
POTASSIUM SERPL-SCNC: 3.9 MMOL/L (ref 3.6–5.5)
PROT SERPL-MCNC: 7.4 G/DL (ref 6–8.2)
SODIUM SERPL-SCNC: 137 MMOL/L (ref 135–145)
TRIGL SERPL-MCNC: 92 MG/DL (ref 0–149)

## 2023-01-13 PROCEDURE — 36415 COLL VENOUS BLD VENIPUNCTURE: CPT

## 2023-01-13 PROCEDURE — 80053 COMPREHEN METABOLIC PANEL: CPT

## 2023-01-13 PROCEDURE — 80061 LIPID PANEL: CPT

## 2023-03-27 ENCOUNTER — OFFICE VISIT (OUTPATIENT)
Dept: INTERNAL MEDICINE | Facility: IMAGING CENTER | Age: 73
End: 2023-03-27
Payer: COMMERCIAL

## 2023-03-27 ENCOUNTER — HOSPITAL ENCOUNTER (OUTPATIENT)
Facility: MEDICAL CENTER | Age: 73
End: 2023-03-27
Attending: FAMILY MEDICINE
Payer: COMMERCIAL

## 2023-03-27 VITALS
RESPIRATION RATE: 17 BRPM | DIASTOLIC BLOOD PRESSURE: 72 MMHG | HEART RATE: 87 BPM | HEIGHT: 64 IN | BODY MASS INDEX: 28.34 KG/M2 | WEIGHT: 166.01 LBS | TEMPERATURE: 98.7 F | SYSTOLIC BLOOD PRESSURE: 122 MMHG | OXYGEN SATURATION: 96 %

## 2023-03-27 DIAGNOSIS — N39.0 URINARY TRACT INFECTION WITHOUT HEMATURIA, SITE UNSPECIFIED: ICD-10-CM

## 2023-03-27 DIAGNOSIS — Z91.89 AT RISK FOR BREAST CANCER: ICD-10-CM

## 2023-03-27 DIAGNOSIS — R82.90 ABNORMAL URINE ODOR: ICD-10-CM

## 2023-03-27 DIAGNOSIS — R55 SYNCOPE, UNSPECIFIED SYNCOPE TYPE: ICD-10-CM

## 2023-03-27 DIAGNOSIS — R82.90 ABNORMAL URINALYSIS: ICD-10-CM

## 2023-03-27 LAB
APPEARANCE UR: NORMAL
BILIRUB UR STRIP-MCNC: NEGATIVE MG/DL
COLOR UR AUTO: YELLOW
GLUCOSE UR STRIP.AUTO-MCNC: NEGATIVE MG/DL
KETONES UR STRIP.AUTO-MCNC: NEGATIVE MG/DL
LEUKOCYTE ESTERASE UR QL STRIP.AUTO: NORMAL
NITRITE UR QL STRIP.AUTO: POSITIVE
PH UR STRIP.AUTO: 6 [PH] (ref 5–8)
PROT UR QL STRIP: NEGATIVE MG/DL
RBC UR QL AUTO: NORMAL
SP GR UR STRIP.AUTO: 1.01
UROBILINOGEN UR STRIP-MCNC: 0.2 MG/DL

## 2023-03-27 PROCEDURE — 99215 OFFICE O/P EST HI 40 MIN: CPT | Performed by: FAMILY MEDICINE

## 2023-03-27 PROCEDURE — 87077 CULTURE AEROBIC IDENTIFY: CPT

## 2023-03-27 PROCEDURE — 87086 URINE CULTURE/COLONY COUNT: CPT

## 2023-03-27 PROCEDURE — 87186 SC STD MICRODIL/AGAR DIL: CPT

## 2023-03-27 PROCEDURE — 81002 URINALYSIS NONAUTO W/O SCOPE: CPT | Performed by: FAMILY MEDICINE

## 2023-03-27 RX ORDER — CEPHALEXIN 500 MG/1
500 CAPSULE ORAL 3 TIMES DAILY
Qty: 30 CAPSULE | Refills: 0 | Status: SHIPPED | OUTPATIENT
Start: 2023-03-27 | End: 2024-01-02

## 2023-03-27 ASSESSMENT — FIBROSIS 4 INDEX: FIB4 SCORE: 1.37

## 2023-04-01 NOTE — PROGRESS NOTES
"Chief Complaint   Patient presents with    Fall     Had a fall on Wednesday, the 22nd. Cousin states she was down for maybe 20-30 seconds. She was unable to move but denies any n/t symptoms. Then she was able to get up and use the bathroom. She states she might have lightly tapped her forehead but no other injuries.        Subjective:     HPI:   Michelle Diego is a 72 y.o. female here  to discuss the evaluation and management of:      She reports that she was talking on the phone with her cousin she got up to go to the restroom  After walking about 20 to 30 feet she fell to the ground  There was no presyncope or loss of consciousness  She just could not move  She kept talking to her cousin, there was no slurred speech    Her cousin reports that she was on the ground for about 20 to 30 seconds  She does not know for sure if she hit her head    She did not have dehydration or lack of food that day    She had symmetrical loss of motor function  No visual changes  No headaches         No problems updated.          Objective:     /72 (BP Location: Left arm, Patient Position: Sitting, BP Cuff Size: Adult)   Pulse 87   Temp 37.1 °C (98.7 °F) (Temporal)   Resp 17   Ht 1.626 m (5' 4\")   Wt 75.3 kg (166 lb 0.1 oz)   SpO2 96%  Body mass index is 28.49 kg/m².    Physical Exam:  Physical Exam  Constitutional: Well-developed and well-nourished. Not diaphoretic. No distress.   Skin: Skin is warm and dry. No rash noted.  Head: Atraumatic without lesions.  Eyes: Conjunctivae and extraocular motions are normal.   Ears:  External ears unremarkable.    Nose: Nares patent. Mucosa without edema or erythema. No discharge. No facial tenderness.     Neck: Supple, No thyromegaly present. No JVD  Cardiovascular: Regular rate and rhythm.   Chest: Effort normal. Clear to auscultation throughout. No adventitious sounds.   Abdomen:  without distention.  .  Extremities: No cyanosis, clubbing, erythema, nor edema.   Neurological: Alert and " oriented x 3.  Pupils equal round reactive to light-extraocular muscles intact  Normal tracking    Psychiatric:  Behavior, mood, and affect are appropriate     Assessment and Plan:     The following treatment plan was discussed/researched:  No problem-specific Assessment & Plan notes found for this encounter.  1. Urinary tract infection without hematuria, site unspecified-Start trial of Keflex    2. Syncope, unspecified syncope type-not actual syncope but nonetheless loss of motor function  - US-CIMT DUPLEX; Future  - MR-BRAIN-W/O; Future    3. Abnormal urine odor  - POCT Urinalysis-consistent with UTI       4. Abnormal urinalysis  - URINE CULTURE(NEW); Future    5. At risk for breast cancer    Other orders  - cephALEXin (KEFLEX) 500 MG Cap; Take 1 Capsule by mouth 3 times a day.  Dispense: 30 Capsule; Refill: 0                      HCC Gap Form    Diagnosis to address: D68.69, I48.91 - Hypercoagulable state due to atrial fibrillation (HCC)  Assessment and plan: Chronic, stable. Continue with current defined treatment plan: Continue with Pradaxa. Follow-up at least annually.  Diagnosis: I48.11 - Longstanding persistent atrial fibrillation (HCC)  Assessment and plan: Chronic, stable, as based on today's assessment and impact on other conditions evaluated today. Continue with current treatment plan: Managed by cardiology, follow-up with specialist as directed, but at least annually.  Last edited 03/31/23 17:02 PDT by Kenna Asif M.D.                                                                                                                                                                             Any change or worsening of signs or symptoms, patient encouraged to follow-up or report to emergency room for further evaluation. Patient verbalizes understanding and agrees.    Follow-Up: No follow-ups on file.      PLEASE NOTE: This dictation was created using voice recognition software. I have made every  reasonable attempt to correct obvious errors, but I expect that there are errors of grammar and possibly content that I did not discover before finalizing the note.      My total time spent caring for the patient on the day of the encounter was  greater than 40 minutes.   This includes obtaining history, reviewing chart, physical exam, patient education, reviewing outside records, placing orders, interpreting tests and coordinating care.

## 2023-04-11 ENCOUNTER — HOSPITAL ENCOUNTER (OUTPATIENT)
Dept: RADIOLOGY | Facility: MEDICAL CENTER | Age: 73
End: 2023-04-11
Attending: FAMILY MEDICINE
Payer: COMMERCIAL

## 2023-04-11 DIAGNOSIS — R55 SYNCOPE, UNSPECIFIED SYNCOPE TYPE: ICD-10-CM

## 2023-04-11 PROCEDURE — 70551 MRI BRAIN STEM W/O DYE: CPT

## 2023-04-12 PROBLEM — I63.89 INFARCTION OF PARIETAL LOBE (HCC): Status: ACTIVE | Noted: 2023-04-12

## 2023-09-26 ENCOUNTER — NON-PROVIDER VISIT (OUTPATIENT)
Dept: INTERNAL MEDICINE | Facility: IMAGING CENTER | Age: 73
End: 2023-09-26
Payer: COMMERCIAL

## 2023-09-26 DIAGNOSIS — Z23 NEED FOR INFLUENZA VACCINATION: ICD-10-CM

## 2023-09-26 PROCEDURE — 90662 IIV NO PRSV INCREASED AG IM: CPT | Performed by: FAMILY MEDICINE

## 2023-09-26 PROCEDURE — 90471 IMMUNIZATION ADMIN: CPT | Performed by: FAMILY MEDICINE

## 2023-12-19 DIAGNOSIS — I48.0 PAROXYSMAL ATRIAL FIBRILLATION (HCC): ICD-10-CM

## 2023-12-19 RX ORDER — METOPROLOL SUCCINATE 25 MG/1
25 TABLET, EXTENDED RELEASE ORAL DAILY
Qty: 90 TABLET | Refills: 0 | Status: SHIPPED | OUTPATIENT
Start: 2023-12-19 | End: 2024-01-02 | Stop reason: SDUPTHER

## 2023-12-19 NOTE — TELEPHONE ENCOUNTER
Is the patient due for a refill? Yes    Was the patient seen the past year? No    Date of last office visit: 12/2/23    Does the patient have an upcoming appointment?  No    Provider to refill:DS    Does the patients insurance require a 100 day supply?  No

## 2024-01-02 ENCOUNTER — OFFICE VISIT (OUTPATIENT)
Dept: CARDIOLOGY | Facility: MEDICAL CENTER | Age: 74
End: 2024-01-02
Attending: INTERNAL MEDICINE
Payer: COMMERCIAL

## 2024-01-02 VITALS
WEIGHT: 174 LBS | HEART RATE: 92 BPM | SYSTOLIC BLOOD PRESSURE: 124 MMHG | HEIGHT: 64 IN | DIASTOLIC BLOOD PRESSURE: 72 MMHG | RESPIRATION RATE: 14 BRPM | OXYGEN SATURATION: 97 % | BODY MASS INDEX: 29.71 KG/M2

## 2024-01-02 DIAGNOSIS — I63.89 INFARCTION OF PARIETAL LOBE (HCC): ICD-10-CM

## 2024-01-02 DIAGNOSIS — E78.5 DYSLIPIDEMIA: Primary | ICD-10-CM

## 2024-01-02 DIAGNOSIS — I48.11 HYPERCOAGULABLE STATE DUE TO LONGSTANDING PERSISTENT ATRIAL FIBRILLATION (HCC): ICD-10-CM

## 2024-01-02 DIAGNOSIS — I48.0 PAROXYSMAL ATRIAL FIBRILLATION (HCC): ICD-10-CM

## 2024-01-02 DIAGNOSIS — D68.69 HYPERCOAGULABLE STATE DUE TO LONGSTANDING PERSISTENT ATRIAL FIBRILLATION (HCC): ICD-10-CM

## 2024-01-02 DIAGNOSIS — I48.11 LONGSTANDING PERSISTENT ATRIAL FIBRILLATION (HCC): ICD-10-CM

## 2024-01-02 DIAGNOSIS — Z98.890 S/P ABLATION OF ATRIAL FIBRILLATION: ICD-10-CM

## 2024-01-02 DIAGNOSIS — Z79.01 ANTICOAGULATED: ICD-10-CM

## 2024-01-02 DIAGNOSIS — Z86.79 S/P ABLATION OF ATRIAL FIBRILLATION: ICD-10-CM

## 2024-01-02 LAB — EKG IMPRESSION: NORMAL

## 2024-01-02 PROCEDURE — 93010 ELECTROCARDIOGRAM REPORT: CPT | Performed by: INTERNAL MEDICINE

## 2024-01-02 PROCEDURE — 3074F SYST BP LT 130 MM HG: CPT | Performed by: INTERNAL MEDICINE

## 2024-01-02 PROCEDURE — 99213 OFFICE O/P EST LOW 20 MIN: CPT | Performed by: INTERNAL MEDICINE

## 2024-01-02 PROCEDURE — 93005 ELECTROCARDIOGRAM TRACING: CPT | Performed by: INTERNAL MEDICINE

## 2024-01-02 PROCEDURE — 99214 OFFICE O/P EST MOD 30 MIN: CPT | Mod: 25 | Performed by: INTERNAL MEDICINE

## 2024-01-02 PROCEDURE — 3078F DIAST BP <80 MM HG: CPT | Performed by: INTERNAL MEDICINE

## 2024-01-02 RX ORDER — ROSUVASTATIN CALCIUM 10 MG/1
10 TABLET, COATED ORAL EVERY EVENING
Qty: 90 TABLET | Refills: 4 | Status: SHIPPED | OUTPATIENT
Start: 2024-01-02

## 2024-01-02 RX ORDER — METOPROLOL SUCCINATE 25 MG/1
25 TABLET, EXTENDED RELEASE ORAL DAILY
Qty: 90 TABLET | Refills: 4 | Status: SHIPPED | OUTPATIENT
Start: 2024-01-02

## 2024-01-02 RX ORDER — DABIGATRAN ETEXILATE 150 MG/1
150 CAPSULE ORAL 2 TIMES DAILY
Qty: 180 CAPSULE | Refills: 4 | Status: SHIPPED | OUTPATIENT
Start: 2024-01-02

## 2024-01-02 NOTE — PROGRESS NOTES
Chief Complaint   Patient presents with    Atrial Fibrillation     F/v Dx:Paroxysmal atrial fibrillation       Wayne NGUYEN is a 73 y.o. female who presents today with persistent atrial fibrillation previous CVA.  Overall stable.  No chest pain, just some mild shortness of breath.  On rate control with metoprolol.  On Pradaxa.  On statins.    Past Medical History:   Diagnosis Date    Atrial fibrillation (HCC)     Breast cancer (HCC)     Cancer (HCC) 1996    Bilat breast , chemo and reconstruction    Cataract     bilateral    Chickenpox     Cold 11/28/2016    resolved     History of breast cancer 07/29/2016    Bilateral--status post bilat mastectomy, chemo. 1996 Tamoxifen x 5 yr.     Influenza     Kidney stone     Mumps     Nasal drainage     Pneumonia     Smallpox     Tonsillitis      Past Surgical History:   Procedure Laterality Date    RECOVERY  2/15/2013    Performed by Cath[facility]Recovery Surgery at [facility]    RECOVERY  12/18/2012    Performed by Cath-Recovery Surgery at [facility]    LUMPECTOMY      Breast cancer reconstruction    OTHER      Bilat breast recon  gilbert flap    OTHER      T&A    OTHER ORTHOPEDIC SURGERY      Recon hand surg    TONSILLECTOMY       Family History   Problem Relation Age of Onset    Heart Disease Mother     Stroke Mother     Cancer Paternal Grandmother         breast    Heart Failure Sister         atrial fibrillation and CHF    Heart Disease Maternal Grandmother      Social History     Socioeconomic History    Marital status: Single     Spouse name: Not on file    Number of children: Not on file    Years of education: Not on file    Highest education level: Not on file   Occupational History    Not on file   Tobacco Use    Smoking status: Former     Current packs/day: 0.00     Average packs/day: 2.0 packs/day for 44.0 years (88.0 ttl pk-yrs)     Types: Cigarettes     Start date: 2/14/1968     Quit date: 2/14/2012     Years since  quittin.8    Smokeless tobacco: Never   Substance and Sexual Activity    Alcohol use: Yes     Comment: social     Drug use: No    Sexual activity: Not Currently     Partners: Male     Comment:    Other Topics Concern    Not on file   Social History Narrative         Social Determinants of Health     Financial Resource Strain: Not on file   Food Insecurity: Not on file   Transportation Needs: Not on file   Physical Activity: Not on file   Stress: Not on file   Social Connections: Not on file   Intimate Partner Violence: Not on file   Housing Stability: Not on file     Allergies   Allergen Reactions    Blackberry Flavor Anaphylaxis     anaphylaxis    Blueberry Flavor Anaphylaxis     anaphylaxis    Food Anaphylaxis     All Berries      Raspberry Anaphylaxis     anaphylaxis    Strawberry Anaphylaxis     anaphylaxis    Iodine Swelling     Swelling- IV  Contrast, topical OK    Vicodin [Hydrocodone-Acetaminophen] Vomiting     sick     Outpatient Encounter Medications as of 2024   Medication Sig Dispense Refill    metoprolol SR (TOPROL XL) 25 MG TABLET SR 24 HR Take 1 Tablet by mouth every day. 90 Tablet 4    rosuvastatin (CRESTOR) 10 MG Tab Take 1 Tablet by mouth every evening. 90 Tablet 4    dabigatran (PRADAXA) 150 MG Cap capsule Take 1 Capsule by mouth 2 times a day. 180 Capsule 4    Non Formulary Request Take  by mouth every day. [place]- PO      Multiple Minerals-Vitamins (CALCIUM-MAGNESIUM-ZINC-D3 PO) Take  by mouth.      vitamin E (VITAMIN E) 1000 Unit (450 mg) Cap Take 1,000 Units by mouth every day.      B Complex Vitamins (B COMPLEX 50 PO) Take 50 mg by mouth every day.      Safety Seal Miscellaneous Misc Inflamazol 1 capsule once daily      Calcium Citrate-Vitamin D (CALCIUM + D PO) Take 1,200 mg by mouth.      vitamin D (CHOLECALCIFEROL) 1000 UNIT Tab Take 2,000 Units by mouth every day.      Probiotic Product (PROBIOTIC PO) Take 1 Tab by mouth every day.        [DISCONTINUED]  "metoprolol SR (TOPROL XL) 25 MG TABLET SR 24 HR Take 1 Tablet by mouth every day. 90 Tablet 0    [DISCONTINUED] cephALEXin (KEFLEX) 500 MG Cap Take 1 Capsule by mouth 3 times a day. 30 Capsule 0    [DISCONTINUED] rosuvastatin (CRESTOR) 10 MG Tab Take 1 Tablet by mouth every evening. 90 Tablet 3    [DISCONTINUED] dabigatran (PRADAXA) 150 MG Cap capsule Take 1 Capsule by mouth 2 times a day. 180 Capsule 3     No facility-administered encounter medications on file as of 1/2/2024.     ROS           Objective     /72 (BP Location: Left arm, Patient Position: Sitting, BP Cuff Size: Adult)   Pulse 92   Resp 14   Ht 1.626 m (5' 4\")   Wt 78.9 kg (174 lb)   SpO2 97%   BMI 29.87 kg/m²     Physical Exam  Constitutional:       Appearance: She is well-developed.   HENT:      Head: Normocephalic and atraumatic.   Eyes:      Pupils: Pupils are equal, round, and reactive to light.   Cardiovascular:      Rate and Rhythm: Normal rate. Rhythm irregular.      Heart sounds: Normal heart sounds. No murmur heard.     No friction rub. No gallop.   Pulmonary:      Effort: Pulmonary effort is normal.      Breath sounds: Normal breath sounds.   Abdominal:      General: Bowel sounds are normal.      Palpations: Abdomen is soft.   Musculoskeletal:         General: Normal range of motion.      Cervical back: Normal range of motion and neck supple.   Skin:     General: Skin is warm.   Neurological:      Mental Status: She is alert and oriented to person, place, and time.      Cranial Nerves: No cranial nerve deficit.   Psychiatric:         Behavior: Behavior normal.         Thought Content: Thought content normal.         Judgment: Judgment normal.                Assessment & Plan     1. Dyslipidemia  EC-ECHOCARDIOGRAM COMPLETE W/O CONT    rosuvastatin (CRESTOR) 10 MG Tab    Comp Metabolic Panel    Lipid Profile      2. Paroxysmal atrial fibrillation (HCC)  EKG    US-CAROTID DOPPLER BILAT    metoprolol SR (TOPROL XL) 25 MG TABLET SR 24 " HR    dabigatran (PRADAXA) 150 MG Cap capsule      3. Longstanding persistent atrial fibrillation (HCC)        4. S/P ablation of atrial fibrillation        5. Infarction of parietal lobe (HCC)        6. Anticoagulated        7. Hypercoagulable state due to longstanding persistent atrial fibrillation (HCC)            Medical Decision Making: Today's Assessment/Status/Plan:   1.  Persistent atrial fibrillation continue rate control.  2.  Anticoagulation continue Pradaxa.  3.  Previous CVA probably related to atrial fibrillation.  Check carotids.  Check echo.  Continue statins.  Labs ordered.  4.  Follow-up with me in 1 year.

## 2024-01-05 ENCOUNTER — HOSPITAL ENCOUNTER (OUTPATIENT)
Dept: CARDIOLOGY | Facility: MEDICAL CENTER | Age: 74
End: 2024-01-05
Attending: INTERNAL MEDICINE
Payer: COMMERCIAL

## 2024-01-05 DIAGNOSIS — E78.5 DYSLIPIDEMIA: ICD-10-CM

## 2024-01-05 LAB
LV EJECT FRACT  99904: 65
LV EJECT FRACT MOD 2C 99903: 65.93
LV EJECT FRACT MOD 4C 99902: 55.17
LV EJECT FRACT MOD BP 99901: 60.34

## 2024-01-05 PROCEDURE — 93306 TTE W/DOPPLER COMPLETE: CPT

## 2024-01-05 PROCEDURE — 93306 TTE W/DOPPLER COMPLETE: CPT | Mod: 26 | Performed by: INTERNAL MEDICINE

## 2024-01-10 ENCOUNTER — HOSPITAL ENCOUNTER (OUTPATIENT)
Dept: RADIOLOGY | Facility: MEDICAL CENTER | Age: 74
End: 2024-01-10
Attending: INTERNAL MEDICINE
Payer: COMMERCIAL

## 2024-01-10 DIAGNOSIS — I48.0 PAROXYSMAL ATRIAL FIBRILLATION (HCC): ICD-10-CM

## 2024-01-10 PROCEDURE — 93880 EXTRACRANIAL BILAT STUDY: CPT

## 2024-01-26 ENCOUNTER — HOSPITAL ENCOUNTER (OUTPATIENT)
Dept: LAB | Facility: MEDICAL CENTER | Age: 74
End: 2024-01-26
Attending: INTERNAL MEDICINE
Payer: COMMERCIAL

## 2024-01-26 DIAGNOSIS — E78.5 DYSLIPIDEMIA: ICD-10-CM

## 2024-01-26 LAB
ALBUMIN SERPL BCP-MCNC: 4.1 G/DL (ref 3.2–4.9)
ALBUMIN/GLOB SERPL: 1.3 G/DL
ALP SERPL-CCNC: 70 U/L (ref 30–99)
ALT SERPL-CCNC: 47 U/L (ref 2–50)
ANION GAP SERPL CALC-SCNC: 9 MMOL/L (ref 7–16)
AST SERPL-CCNC: 28 U/L (ref 12–45)
BILIRUB SERPL-MCNC: 0.7 MG/DL (ref 0.1–1.5)
BUN SERPL-MCNC: 14 MG/DL (ref 8–22)
CALCIUM ALBUM COR SERPL-MCNC: 9 MG/DL (ref 8.5–10.5)
CALCIUM SERPL-MCNC: 9.1 MG/DL (ref 8.5–10.5)
CHLORIDE SERPL-SCNC: 102 MMOL/L (ref 96–112)
CHOLEST SERPL-MCNC: 127 MG/DL (ref 100–199)
CO2 SERPL-SCNC: 27 MMOL/L (ref 20–33)
CREAT SERPL-MCNC: 0.73 MG/DL (ref 0.5–1.4)
FASTING STATUS PATIENT QL REPORTED: NORMAL
GFR SERPLBLD CREATININE-BSD FMLA CKD-EPI: 86 ML/MIN/1.73 M 2
GLOBULIN SER CALC-MCNC: 3.1 G/DL (ref 1.9–3.5)
GLUCOSE SERPL-MCNC: 105 MG/DL (ref 65–99)
HDLC SERPL-MCNC: 61 MG/DL
LDLC SERPL CALC-MCNC: 46 MG/DL
POTASSIUM SERPL-SCNC: 4.2 MMOL/L (ref 3.6–5.5)
PROT SERPL-MCNC: 7.2 G/DL (ref 6–8.2)
SODIUM SERPL-SCNC: 138 MMOL/L (ref 135–145)
TRIGL SERPL-MCNC: 99 MG/DL (ref 0–149)

## 2024-01-26 PROCEDURE — 80053 COMPREHEN METABOLIC PANEL: CPT

## 2024-01-26 PROCEDURE — 80061 LIPID PANEL: CPT

## 2024-01-26 PROCEDURE — 36415 COLL VENOUS BLD VENIPUNCTURE: CPT

## 2025-03-22 DIAGNOSIS — I48.0 PAROXYSMAL ATRIAL FIBRILLATION (HCC): ICD-10-CM

## 2025-03-22 DIAGNOSIS — E78.5 DYSLIPIDEMIA: ICD-10-CM

## 2025-03-23 DIAGNOSIS — I48.0 PAROXYSMAL ATRIAL FIBRILLATION (HCC): ICD-10-CM

## 2025-03-24 ENCOUNTER — PATIENT MESSAGE (OUTPATIENT)
Dept: CARDIOLOGY | Facility: MEDICAL CENTER | Age: 75
End: 2025-03-24
Payer: COMMERCIAL

## 2025-03-24 RX ORDER — ATENOLOL 100 MG/1
150 TABLET ORAL 2 TIMES DAILY
Qty: 180 CAPSULE | Refills: 0 | Status: SHIPPED | OUTPATIENT
Start: 2025-03-24

## 2025-03-24 RX ORDER — METOPROLOL SUCCINATE 25 MG/1
25 TABLET, EXTENDED RELEASE ORAL DAILY
Qty: 90 TABLET | Refills: 0 | Status: SHIPPED | OUTPATIENT
Start: 2025-03-24

## 2025-03-24 RX ORDER — ROSUVASTATIN CALCIUM 10 MG/1
10 TABLET, COATED ORAL EVERY EVENING
Qty: 90 TABLET | Refills: 0 | Status: SHIPPED | OUTPATIENT
Start: 2025-03-24

## 2025-03-24 NOTE — TELEPHONE ENCOUNTER
Is the patient due for a refill? Yes    Was the patient seen the last 15 months? Yes    Date of last office visit: 01.02.2024    Does the patient have an upcoming appointment?  No       Provider to refill: DS    Does the patient have [facility] and need 100-day supply? (This applies to ALL medications) Patient does not have SCP

## 2025-06-19 DIAGNOSIS — E78.5 DYSLIPIDEMIA: ICD-10-CM

## 2025-06-19 DIAGNOSIS — I48.0 PAROXYSMAL ATRIAL FIBRILLATION (HCC): ICD-10-CM

## 2025-06-19 RX ORDER — DABIGATRAN ETEXILATE 150 MG/1
150 CAPSULE ORAL 2 TIMES DAILY
Qty: 180 CAPSULE | Refills: 0 | Status: SHIPPED | OUTPATIENT
Start: 2025-06-19

## 2025-06-19 RX ORDER — METOPROLOL SUCCINATE 25 MG/1
25 TABLET, EXTENDED RELEASE ORAL DAILY
Qty: 90 TABLET | Refills: 0 | Status: SHIPPED | OUTPATIENT
Start: 2025-06-19

## 2025-06-19 RX ORDER — ROSUVASTATIN CALCIUM 10 MG/1
10 TABLET, COATED ORAL EVERY EVENING
Qty: 90 TABLET | Refills: 0 | Status: SHIPPED | OUTPATIENT
Start: 2025-06-19

## 2025-06-19 NOTE — TELEPHONE ENCOUNTER
Is the patient due for a refill? Yes    Was the patient seen the last 15 months? No    Date of last office visit: 01.02.2024    Does the patient have an upcoming appointment?  No    Provider to refill:DS    Does the patient have [facility] and need 100-day supply? (This applies to ALL medications) Patient does not have SCP